# Patient Record
Sex: FEMALE | Race: OTHER | HISPANIC OR LATINO
[De-identification: names, ages, dates, MRNs, and addresses within clinical notes are randomized per-mention and may not be internally consistent; named-entity substitution may affect disease eponyms.]

---

## 2020-12-21 PROBLEM — Z00.00 ENCOUNTER FOR PREVENTIVE HEALTH EXAMINATION: Status: ACTIVE | Noted: 2020-12-21

## 2021-01-14 ENCOUNTER — APPOINTMENT (OUTPATIENT)
Dept: OBGYN | Facility: CLINIC | Age: 36
End: 2021-01-14
Payer: COMMERCIAL

## 2021-01-14 VITALS
HEIGHT: 64.5 IN | WEIGHT: 156 LBS | DIASTOLIC BLOOD PRESSURE: 67 MMHG | BODY MASS INDEX: 26.31 KG/M2 | TEMPERATURE: 98.6 F | HEART RATE: 78 BPM | SYSTOLIC BLOOD PRESSURE: 109 MMHG

## 2021-01-14 DIAGNOSIS — Z82.49 FAMILY HISTORY OF ISCHEMIC HEART DISEASE AND OTHER DISEASES OF THE CIRCULATORY SYSTEM: ICD-10-CM

## 2021-01-14 DIAGNOSIS — I82.409 ACUTE EMBOLISM AND THROMBOSIS OF UNSPECIFIED DEEP VEINS OF UNSPECIFIED LOWER EXTREMITY: ICD-10-CM

## 2021-01-14 DIAGNOSIS — Z83.3 FAMILY HISTORY OF DIABETES MELLITUS: ICD-10-CM

## 2021-01-14 DIAGNOSIS — Z78.9 OTHER SPECIFIED HEALTH STATUS: ICD-10-CM

## 2021-01-14 PROCEDURE — 99072 ADDL SUPL MATRL&STAF TM PHE: CPT

## 2021-01-14 PROCEDURE — 99205 OFFICE O/P NEW HI 60 MIN: CPT

## 2021-01-16 NOTE — DISCUSSION/SUMMARY
[FreeTextEntry1] : 34 yo G0 here for eval and management of lt hydrosalpinx prior to 3rd attempt at IVF.\par Pt w/past hx of Hirschsprung's dz --> significant adhesive disease as noted during rt salpingectomy 2015 (lsc --> xlap)\par Pt interested in lt salpingectomy to increase success rate of next embryo transfer.\par \par H/o DVT during IVF\par \par Plan\par USG\par Refer to colorectal surg for consultation for likely combined procedure.\par \par Consider lsc salpingectomy - consider vaginal approach for entry.\par \par Letter to Dr. Sheng Guadalupe\par Letter to Dr. Boni Kelsey\par \par \par

## 2021-01-16 NOTE — PHYSICAL EXAM
[Labia Majora] : normal [Labia Minora] : normal [Normal] : normal [Appropriately responsive] : appropriately responsive [FreeTextEntry7] : Multiple abd incisions.  [FreeTextEntry4] : Post CDS, free and mobile.

## 2021-01-16 NOTE — HISTORY OF PRESENT ILLNESS
[FreeTextEntry1] : 34 yo G0 here for eval.\par Pt diagnosed w/rt hydrosalpinx --> lsc --> xlap rt salpingectomy ()\par During that procedure spill from left on chromopertubation but unable to access left tube for salpingectomy.\par HSG now shows now spill from left.\par Pt s/p 2 failed embryo transfers thought to be secondary to hydrosalpinges.\par Pt s/p attempted hsc 2019 to obstruct tubal ostia via ablation.\par Followup HSG showed continued patency of lt tube.\par \par Pt w/h/o Hirschsprung dz as  - h/o multiple bowels surgeries. (confirmed by my conversation w/Dr. Boni Kelsey, her Peds surgeon.)\par \par \par Referred by Dr. Sheng Guadalupe.\par \par

## 2021-01-26 ENCOUNTER — APPOINTMENT (OUTPATIENT)
Dept: COLORECTAL SURGERY | Facility: CLINIC | Age: 36
End: 2021-01-26
Payer: COMMERCIAL

## 2021-01-26 VITALS
DIASTOLIC BLOOD PRESSURE: 71 MMHG | BODY MASS INDEX: 25.49 KG/M2 | WEIGHT: 153 LBS | SYSTOLIC BLOOD PRESSURE: 111 MMHG | OXYGEN SATURATION: 99 % | RESPIRATION RATE: 14 BRPM | HEIGHT: 65 IN | HEART RATE: 67 BPM | TEMPERATURE: 98.6 F

## 2021-01-26 PROCEDURE — 99204 OFFICE O/P NEW MOD 45 MIN: CPT

## 2021-01-26 PROCEDURE — 99072 ADDL SUPL MATRL&STAF TM PHE: CPT

## 2021-01-26 NOTE — ASSESSMENT
[FreeTextEntry1] : History of Hirschsprung's disease with multiple pelvic surgeries\par -Patient is here to undergo left fallopian tube excision to increase her chances of fertilization. This procedure is not without risk. I discussed the possibility of bowel injury and even colostomy creation.\par -I recommended initial evaluation of the pelvis. We will obtain a CT of the abdomen and pelvis to evaluate colonic anatomy and pelvic structures\par -I recommended patient undergo flexible sigmoidoscopy to evaluate rectal stump and for possible fixed pelvis\par -I will discuss the case with Dr. Wu To further determine risk/benefit Of undergoing pelvic surgery in the setting of this patient's infertility\par -All questions were answered patient will schedule the CT scan and sigmoidoscopy at her convenience

## 2021-01-26 NOTE — HISTORY OF PRESENT ILLNESS
[FreeTextEntry1] : Patient is a 34 yo female, referred by Dr Goldy Wu for consultation.  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  She has been progressing well with 2 bowel movements per day and no bowel complaints. 5 years ago she was noted to have a right hydrosalpinx and was taken to the OR for laparoscopy converted to open salpingectomy. The left tube could not be evaluated at that time. Currently patient is undergoing in vitro fertilization and has failed her to initial attempts at implantation. Implantation is affected by hydrosalpinx and she is now noted to have dilation of the left tube. She presents today to discuss excision. She was initially evaluated at [facility] and was told that the procedure would be significantly technically difficult. She was then referred to Dr. Wu for evaluation.  He recommended colorectal surgery consultation. As stated above, she currently is asymptomatic from an GI standpoint. No history of colonoscopy\par \par \par \par

## 2021-01-26 NOTE — PHYSICAL EXAM
[Normal Breath Sounds] : Normal breath sounds [Normal Heart Sounds] : normal heart sounds [Normal Rate and Rhythm] : normal rate and rhythm [Alert] : alert [Oriented to Person] : oriented to person [Oriented to Place] : oriented to place [Oriented to Time] : oriented to time [Calm] : calm [de-identified] : soft, +BS [de-identified] : well nourished, well appearing [de-identified] : NC/AT [de-identified] : +ROM/RACHAEL [de-identified] : intact

## 2021-01-26 NOTE — CONSULT LETTER
[Dear  ___] : Dear  [unfilled], [Consult Letter:] : I had the pleasure of evaluating your patient, [unfilled]. [Please see my note below.] : Please see my note below. [Sincerely,] : Sincerely, [FreeTextEntry2] : Goldy Wu [FreeTextEntry3] : Price Evangelista MD FACS\par Chief Colon and Rectal Surgery\par [facility]

## 2021-02-06 ENCOUNTER — TRANSCRIPTION ENCOUNTER (OUTPATIENT)
Age: 36
End: 2021-02-06

## 2021-02-06 ENCOUNTER — OUTPATIENT (OUTPATIENT)
Dept: OUTPATIENT SERVICES | Facility: HOSPITAL | Age: 36
LOS: 1 days | End: 2021-02-06
Payer: COMMERCIAL

## 2021-02-06 ENCOUNTER — RESULT REVIEW (OUTPATIENT)
Age: 36
End: 2021-02-06

## 2021-02-06 ENCOUNTER — APPOINTMENT (OUTPATIENT)
Dept: CT IMAGING | Facility: CLINIC | Age: 36
End: 2021-02-06
Payer: COMMERCIAL

## 2021-02-06 DIAGNOSIS — Z00.8 ENCOUNTER FOR OTHER GENERAL EXAMINATION: ICD-10-CM

## 2021-02-06 PROCEDURE — 74177 CT ABD & PELVIS W/CONTRAST: CPT

## 2021-02-06 PROCEDURE — 74177 CT ABD & PELVIS W/CONTRAST: CPT | Mod: 26

## 2021-02-19 RX ORDER — NEOMYCIN SULFATE 500 MG/1
500 TABLET ORAL
Qty: 3 | Refills: 0 | Status: ACTIVE | COMMUNITY
Start: 2021-02-19 | End: 1900-01-01

## 2021-02-19 RX ORDER — METRONIDAZOLE 500 MG/1
500 TABLET ORAL
Qty: 3 | Refills: 0 | Status: ACTIVE | COMMUNITY
Start: 2021-02-19 | End: 1900-01-01

## 2021-03-02 ENCOUNTER — ASOB RESULT (OUTPATIENT)
Age: 36
End: 2021-03-02

## 2021-03-02 ENCOUNTER — APPOINTMENT (OUTPATIENT)
Dept: OBGYN | Facility: CLINIC | Age: 36
End: 2021-03-02
Payer: COMMERCIAL

## 2021-03-02 VITALS
WEIGHT: 160.25 LBS | HEIGHT: 65 IN | TEMPERATURE: 97.8 F | SYSTOLIC BLOOD PRESSURE: 113 MMHG | HEART RATE: 64 BPM | BODY MASS INDEX: 26.7 KG/M2 | DIASTOLIC BLOOD PRESSURE: 71 MMHG

## 2021-03-02 PROCEDURE — 99212 OFFICE O/P EST SF 10 MIN: CPT

## 2021-03-02 PROCEDURE — 99072 ADDL SUPL MATRL&STAF TM PHE: CPT

## 2021-03-02 NOTE — HISTORY OF PRESENT ILLNESS
[FreeTextEntry1] : 36 yo G0 here for f/u after USG and consultation w/Dr. Jean Carlos mark.\par Pt with known lt hydrosalpinx in setting of history of multiple abd/pelvic procedures related to Hirschsprung's dz.\par Goal of planned procedure is to perform lt salpingectomy to maximize chance of IVF\par \par Pt has flex sigmoid scheduled for this Thursday.\par

## 2021-03-02 NOTE — DISCUSSION/SUMMARY
[FreeTextEntry1] : 34 yo G0 w/lt hydrosalpinx and pelvic with suspected adhesive disease.\par Pt now s/p consultation w/Dr. Evangelista, s/p CT abd/pelv, s/p USG today.\par Spoke to pt again about risks of surgery including, inability to perform salpingectomy or even to disrupt the left tube.  Also spoke about possibility of XLAP, and damage to bowel and bladder.\par Pt's  present on phone for conversation.\par \par Plan\par Pt to keep appointment for surgery first week of April.\par Consider atypical entry location (including posterior CDS)

## 2021-03-04 ENCOUNTER — APPOINTMENT (OUTPATIENT)
Dept: COLORECTAL SURGERY | Facility: CLINIC | Age: 36
End: 2021-03-04
Payer: COMMERCIAL

## 2021-03-04 DIAGNOSIS — Z87.738 PERSONAL HISTORY OF OTHER SPECIFIED (CORRECTED) CONGENITAL MALFORMATIONS OF DIGESTIVE SYSTEM: ICD-10-CM

## 2021-03-04 PROCEDURE — 99072 ADDL SUPL MATRL&STAF TM PHE: CPT

## 2021-03-04 PROCEDURE — 99213 OFFICE O/P EST LOW 20 MIN: CPT | Mod: 25

## 2021-03-04 PROCEDURE — 45330 DIAGNOSTIC SIGMOIDOSCOPY: CPT

## 2021-03-04 NOTE — HISTORY OF PRESENT ILLNESS
[FreeTextEntry1] : 35-year-old female with pan colonic Hirschsprung's disease status post modified Duhamel procedure as an infant. Patient is currently undergoing in vitro fertilization with hydrosalpinx on the left side it is believed that this will decrease her chances for fertilization. Patient is currently without complaint normal bowel movements no blood per rectum no fevers or chills no nausea or vomiting no aggravating factors

## 2021-03-04 NOTE — ASSESSMENT
[FreeTextEntry1] : Hydrosalpinx with history of Hirschsprung's\par -After discussion with the patient's pediatric surgeon and gynecologist, we will proceed with exploratory laparotomy and lysis of adhesions. The goal of the procedure will be identification and isolation of left fallopian tube for excision\par -We once again discussed risks and benefits of the procedure\par -CT imaging was reviewed\par -Patient proceed with surgery as discussed

## 2021-03-25 ENCOUNTER — OUTPATIENT (OUTPATIENT)
Dept: OUTPATIENT SERVICES | Facility: HOSPITAL | Age: 36
LOS: 1 days | End: 2021-03-25

## 2021-03-25 VITALS
OXYGEN SATURATION: 99 % | RESPIRATION RATE: 16 BRPM | DIASTOLIC BLOOD PRESSURE: 66 MMHG | SYSTOLIC BLOOD PRESSURE: 117 MMHG | HEIGHT: 65 IN | HEART RATE: 52 BPM | WEIGHT: 158.07 LBS | TEMPERATURE: 98 F

## 2021-03-25 DIAGNOSIS — Q43.1 HIRSCHSPRUNG'S DISEASE: Chronic | ICD-10-CM

## 2021-03-25 DIAGNOSIS — N70.11 CHRONIC SALPINGITIS: ICD-10-CM

## 2021-03-25 DIAGNOSIS — Z90.79 ACQUIRED ABSENCE OF OTHER GENITAL ORGAN(S): Chronic | ICD-10-CM

## 2021-03-25 LAB
A1C WITH ESTIMATED AVERAGE GLUCOSE RESULT: 4.9 % — SIGNIFICANT CHANGE UP (ref 4–5.6)
ANION GAP SERPL CALC-SCNC: 14 MMOL/L — SIGNIFICANT CHANGE UP (ref 7–14)
BLD GP AB SCN SERPL QL: NEGATIVE — SIGNIFICANT CHANGE UP
BUN SERPL-MCNC: 14 MG/DL — SIGNIFICANT CHANGE UP (ref 7–23)
CALCIUM SERPL-MCNC: 9.2 MG/DL — SIGNIFICANT CHANGE UP (ref 8.4–10.5)
CHLORIDE SERPL-SCNC: 102 MMOL/L — SIGNIFICANT CHANGE UP (ref 98–107)
CO2 SERPL-SCNC: 21 MMOL/L — LOW (ref 22–31)
CREAT SERPL-MCNC: 0.65 MG/DL — SIGNIFICANT CHANGE UP (ref 0.5–1.3)
ESTIMATED AVERAGE GLUCOSE: 94 MG/DL — SIGNIFICANT CHANGE UP (ref 68–114)
GLUCOSE SERPL-MCNC: 70 MG/DL — SIGNIFICANT CHANGE UP (ref 70–99)
HCG UR QL: NEGATIVE — SIGNIFICANT CHANGE UP
HCT VFR BLD CALC: 35.1 % — SIGNIFICANT CHANGE UP (ref 34.5–45)
HGB BLD-MCNC: 11.2 G/DL — LOW (ref 11.5–15.5)
MCHC RBC-ENTMCNC: 27.7 PG — SIGNIFICANT CHANGE UP (ref 27–34)
MCHC RBC-ENTMCNC: 31.9 GM/DL — LOW (ref 32–36)
MCV RBC AUTO: 86.7 FL — SIGNIFICANT CHANGE UP (ref 80–100)
NRBC # BLD: 0 /100 WBCS — SIGNIFICANT CHANGE UP
NRBC # FLD: 0 K/UL — SIGNIFICANT CHANGE UP
PLATELET # BLD AUTO: 215 K/UL — SIGNIFICANT CHANGE UP (ref 150–400)
POTASSIUM SERPL-MCNC: 3.8 MMOL/L — SIGNIFICANT CHANGE UP (ref 3.5–5.3)
POTASSIUM SERPL-SCNC: 3.8 MMOL/L — SIGNIFICANT CHANGE UP (ref 3.5–5.3)
RBC # BLD: 4.05 M/UL — SIGNIFICANT CHANGE UP (ref 3.8–5.2)
RBC # FLD: 14.8 % — HIGH (ref 10.3–14.5)
RH IG SCN BLD-IMP: POSITIVE — SIGNIFICANT CHANGE UP
SODIUM SERPL-SCNC: 137 MMOL/L — SIGNIFICANT CHANGE UP (ref 135–145)
WBC # BLD: 5.98 K/UL — SIGNIFICANT CHANGE UP (ref 3.8–10.5)
WBC # FLD AUTO: 5.98 K/UL — SIGNIFICANT CHANGE UP (ref 3.8–10.5)

## 2021-03-25 RX ORDER — CHLORHEXIDINE GLUCONATE 213 G/1000ML
1 SOLUTION TOPICAL DAILY
Refills: 0 | Status: DISCONTINUED | OUTPATIENT
Start: 2021-04-09 | End: 2021-04-12

## 2021-03-25 NOTE — H&P PST ADULT - NSICDXPASTMEDICALHX_GEN_ALL_CORE_FT
PAST MEDICAL HISTORY:  Anemia iron infusions 2020    Chronic salpingitis     DVT, lower extremity left 2020 treated with eliquis    Hirschsprung's disease

## 2021-03-25 NOTE — H&P PST ADULT - ASSESSMENT
Problem: chronic salpingitis, Hirschsprung's disease   Assessment and Plan: Pt is tentatively scheduled for lysis of adhesions possible bowel resection laparoscopic salpingectomy for 4/9/21. Pre-op instructions provided. Pt given verbal and written instructions with teach back on chlorhexidine shampoo and pepcid. Pt has a scheduled preop COVID test. Urine cup provided for day of procedure pregnancy test. Pt verbalized understanding with return demonstration.

## 2021-03-25 NOTE — H&P PST ADULT - HISTORY OF PRESENT ILLNESS
35 year old female presents to presurgical testing with diagnosis of chronic salpingitis and Hirschsprung's disease scheduled for lysis of adhesions possible bowel resection laparoscopic salpingectomy. Pt states during IVF evaluation, hydrosalpinx was found, h/o multiple abdominal surgeries for Hirschsprung as a child. 35 year old female presents to presurgical testing with diagnosis of chronic salpingitis and Hirschsprung's disease scheduled for lysis of adhesions possible bowel resection laparoscopic salpingectomy. Pt states during IVF evaluation, hydrosalpinx was found, pt with h/o multiple abdominal surgeries for Hirschsprung as a child.

## 2021-03-25 NOTE — H&P PST ADULT - NSICDXPASTSURGICALHX_GEN_ALL_CORE_FT
PAST SURGICAL HISTORY:  Hirschsprung's disease multiple surgeries    History of salpingectomy Right

## 2021-03-26 RX ORDER — NEOMYCIN SULFATE 500 MG/1
500 TABLET ORAL
Qty: 3 | Refills: 0 | Status: ACTIVE | COMMUNITY
Start: 2021-03-26 | End: 1900-01-01

## 2021-03-26 RX ORDER — METRONIDAZOLE 500 MG/1
500 TABLET ORAL
Qty: 3 | Refills: 0 | Status: ACTIVE | COMMUNITY
Start: 2021-03-26 | End: 1900-01-01

## 2021-03-30 DIAGNOSIS — Z01.818 ENCOUNTER FOR OTHER PREPROCEDURAL EXAMINATION: ICD-10-CM

## 2021-04-06 ENCOUNTER — APPOINTMENT (OUTPATIENT)
Dept: OBGYN | Facility: CLINIC | Age: 36
End: 2021-04-06

## 2021-04-07 LAB — SARS-COV-2 N GENE NPH QL NAA+PROBE: NOT DETECTED

## 2021-04-08 ENCOUNTER — TRANSCRIPTION ENCOUNTER (OUTPATIENT)
Age: 36
End: 2021-04-08

## 2021-04-08 NOTE — ASU PATIENT PROFILE, ADULT - PMH
Anemia  iron infusions 2020  Chronic salpingitis    DVT, lower extremity  left 2020 treated with eliquis  Hirschsprung's disease

## 2021-04-09 ENCOUNTER — APPOINTMENT (OUTPATIENT)
Dept: OBGYN | Facility: HOSPITAL | Age: 36
End: 2021-04-09

## 2021-04-09 ENCOUNTER — RESULT REVIEW (OUTPATIENT)
Age: 36
End: 2021-04-09

## 2021-04-09 ENCOUNTER — INPATIENT (INPATIENT)
Facility: HOSPITAL | Age: 36
LOS: 3 days | Discharge: HOME | End: 2021-04-13
Attending: STUDENT IN AN ORGANIZED HEALTH CARE EDUCATION/TRAINING PROGRAM | Admitting: STUDENT IN AN ORGANIZED HEALTH CARE EDUCATION/TRAINING PROGRAM
Payer: COMMERCIAL

## 2021-04-09 ENCOUNTER — APPOINTMENT (OUTPATIENT)
Dept: COLORECTAL SURGERY | Facility: HOSPITAL | Age: 36
End: 2021-04-09
Payer: COMMERCIAL

## 2021-04-09 VITALS
DIASTOLIC BLOOD PRESSURE: 55 MMHG | OXYGEN SATURATION: 98 % | SYSTOLIC BLOOD PRESSURE: 112 MMHG | WEIGHT: 158.07 LBS | HEIGHT: 65 IN | HEART RATE: 83 BPM | TEMPERATURE: 98 F | RESPIRATION RATE: 18 BRPM

## 2021-04-09 DIAGNOSIS — Z90.79 ACQUIRED ABSENCE OF OTHER GENITAL ORGAN(S): Chronic | ICD-10-CM

## 2021-04-09 DIAGNOSIS — K21.9 GASTRO-ESOPHAGEAL REFLUX DISEASE W/OUT ESOPHAGITIS: ICD-10-CM

## 2021-04-09 DIAGNOSIS — N70.11 CHRONIC SALPINGITIS: ICD-10-CM

## 2021-04-09 DIAGNOSIS — Q43.1 HIRSCHSPRUNG'S DISEASE: Chronic | ICD-10-CM

## 2021-04-09 LAB
GLUCOSE BLDC GLUCOMTR-MCNC: 146 MG/DL — HIGH (ref 70–99)
HCG UR QL: NEGATIVE — SIGNIFICANT CHANGE UP
RH IG SCN BLD-IMP: POSITIVE — SIGNIFICANT CHANGE UP

## 2021-04-09 PROCEDURE — 49320 DIAG LAPARO SEPARATE PROC: CPT

## 2021-04-09 PROCEDURE — 88302 TISSUE EXAM BY PATHOLOGIST: CPT | Mod: 26

## 2021-04-09 PROCEDURE — 58700 REMOVAL OF FALLOPIAN TUBE: CPT

## 2021-04-09 PROCEDURE — 50715 RELEASE OF URETER: CPT | Mod: 59

## 2021-04-09 RX ORDER — ENOXAPARIN SODIUM 100 MG/ML
40 INJECTION SUBCUTANEOUS DAILY
Refills: 0 | Status: DISCONTINUED | OUTPATIENT
Start: 2021-04-10 | End: 2021-04-13

## 2021-04-09 RX ORDER — OXYCODONE HYDROCHLORIDE 5 MG/1
5 TABLET ORAL
Refills: 0 | Status: DISCONTINUED | OUTPATIENT
Start: 2021-04-09 | End: 2021-04-09

## 2021-04-09 RX ORDER — CELECOXIB 200 MG/1
400 CAPSULE ORAL ONCE
Refills: 0 | Status: COMPLETED | OUTPATIENT
Start: 2021-04-09 | End: 2021-04-09

## 2021-04-09 RX ORDER — ACETAMINOPHEN 500 MG
1000 TABLET ORAL EVERY 6 HOURS
Refills: 0 | Status: COMPLETED | OUTPATIENT
Start: 2021-04-09 | End: 2021-04-10

## 2021-04-09 RX ORDER — HYDROMORPHONE HYDROCHLORIDE 2 MG/ML
0.5 INJECTION INTRAMUSCULAR; INTRAVENOUS; SUBCUTANEOUS EVERY 4 HOURS
Refills: 0 | Status: DISCONTINUED | OUTPATIENT
Start: 2021-04-09 | End: 2021-04-12

## 2021-04-09 RX ORDER — HYDROMORPHONE HYDROCHLORIDE 2 MG/ML
0.5 INJECTION INTRAMUSCULAR; INTRAVENOUS; SUBCUTANEOUS
Refills: 0 | Status: DISCONTINUED | OUTPATIENT
Start: 2021-04-09 | End: 2021-04-09

## 2021-04-09 RX ORDER — SODIUM CHLORIDE 9 MG/ML
1000 INJECTION, SOLUTION INTRAVENOUS
Refills: 0 | Status: DISCONTINUED | OUTPATIENT
Start: 2021-04-09 | End: 2021-04-10

## 2021-04-09 RX ORDER — OXYCODONE HYDROCHLORIDE 5 MG/1
10 TABLET ORAL
Refills: 0 | Status: DISCONTINUED | OUTPATIENT
Start: 2021-04-09 | End: 2021-04-09

## 2021-04-09 RX ORDER — ONDANSETRON 8 MG/1
4 TABLET, FILM COATED ORAL ONCE
Refills: 0 | Status: DISCONTINUED | OUTPATIENT
Start: 2021-04-09 | End: 2021-04-09

## 2021-04-09 RX ORDER — SODIUM CHLORIDE 9 MG/ML
1000 INJECTION, SOLUTION INTRAVENOUS
Refills: 0 | Status: DISCONTINUED | OUTPATIENT
Start: 2021-04-09 | End: 2021-04-09

## 2021-04-09 RX ORDER — ONDANSETRON 8 MG/1
4 TABLET, FILM COATED ORAL EVERY 6 HOURS
Refills: 0 | Status: DISCONTINUED | OUTPATIENT
Start: 2021-04-09 | End: 2021-04-10

## 2021-04-09 RX ORDER — NALOXONE HYDROCHLORIDE 4 MG/.1ML
0.1 SPRAY NASAL
Refills: 0 | Status: DISCONTINUED | OUTPATIENT
Start: 2021-04-09 | End: 2021-04-13

## 2021-04-09 RX ORDER — DIPHENHYDRAMINE HCL 50 MG
25 CAPSULE ORAL ONCE
Refills: 0 | Status: DISCONTINUED | OUTPATIENT
Start: 2021-04-09 | End: 2021-04-09

## 2021-04-09 RX ORDER — GABAPENTIN 400 MG/1
600 CAPSULE ORAL ONCE
Refills: 0 | Status: COMPLETED | OUTPATIENT
Start: 2021-04-09 | End: 2021-04-09

## 2021-04-09 RX ORDER — DIPHENHYDRAMINE HCL 50 MG
25 CAPSULE ORAL ONCE
Refills: 0 | Status: DISCONTINUED | OUTPATIENT
Start: 2021-04-09 | End: 2021-04-10

## 2021-04-09 RX ORDER — BUTORPHANOL TARTRATE 2 MG/ML
0.25 INJECTION, SOLUTION INTRAMUSCULAR; INTRAVENOUS EVERY 6 HOURS
Refills: 0 | Status: DISCONTINUED | OUTPATIENT
Start: 2021-04-09 | End: 2021-04-09

## 2021-04-09 RX ADMIN — SODIUM CHLORIDE 30 MILLILITER(S): 9 INJECTION, SOLUTION INTRAVENOUS at 06:42

## 2021-04-09 RX ADMIN — CELECOXIB 400 MILLIGRAM(S): 200 CAPSULE ORAL at 07:19

## 2021-04-09 RX ADMIN — SODIUM CHLORIDE 110 MILLILITER(S): 9 INJECTION, SOLUTION INTRAVENOUS at 14:44

## 2021-04-09 RX ADMIN — Medication 1000 MILLIGRAM(S): at 18:21

## 2021-04-09 RX ADMIN — GABAPENTIN 600 MILLIGRAM(S): 400 CAPSULE ORAL at 07:19

## 2021-04-09 RX ADMIN — Medication 400 MILLIGRAM(S): at 17:51

## 2021-04-09 NOTE — BRIEF OPERATIVE NOTE - OPERATION/FINDINGS
Grossly normal vaginal canal and cervix. Extensive adhesions from bowel to anterior abdominal wall and sidewall. Unable to visualize uterus, but based on physical exam felt approximately 9cm. Unable to visualize right ovary. Left ovary normal. Left fallopian tube noted with hydrosalpinx, and significant adhesions to bowel that were taken down.
Exploratory laparotomy with extensive lysis of adhesions.  Small serosal tear x2 repaired with lembert vicryl sutures.  Left salpingectomy performed with GYN.

## 2021-04-09 NOTE — BRIEF OPERATIVE NOTE - NSICDXBRIEFPREOP_GEN_ALL_CORE_FT
PRE-OP DIAGNOSIS:  Female infertility 09-Apr-2021 13:20:08  Wilebrt Sullivan  
PRE-OP DIAGNOSIS:  Hydrosalpinx 09-Apr-2021 12:59:51  Devon Campos

## 2021-04-09 NOTE — BRIEF OPERATIVE NOTE - NSICDXBRIEFPOSTOP_GEN_ALL_CORE_FT
POST-OP DIAGNOSIS:  Hydrosalpinx 09-Apr-2021 13:00:37  Devon Campos  
POST-OP DIAGNOSIS:  Hydrosalpinx 09-Apr-2021 13:00:37  Devon Campos

## 2021-04-09 NOTE — PATIENT PROFILE ADULT - NSPROEXTENSIONSOFSELF_GEN_A_NUR
Recurrent migraines. Seen in office and urgent care . Discussed DASHA. Referral to neurology.   none

## 2021-04-09 NOTE — CHART NOTE - NSCHARTNOTEFT_GEN_A_CORE
Post Operative Note  Patient: RUTH CAMACHO 35y (1985) Female   MRN: [id number]  Location: [place] [id number] B  Visit: 04-09-21 Inpatient  Date: 04-09-21 @ 17:10    Procedure: S/P ex-lap, GENO, left salpingectomy with gyn    Subjective: Patient reports pain well controlled. Endorses itching all over. Denies redness, swelling, SOB, fevers, chills. Reports has felt itching like this before that resolves with benadryl. Heat packs are helping relieve the abdominal pain.      Objective:  Vitals: T(F): 97.3 (04-09-21 @ 14:44), Max: 98.2 (04-09-21 @ 12:50)  HR: 107 (04-09-21 @ 14:44)  BP: 109/64 (04-09-21 @ 14:44) (102/66 - 118/47)  RR: 14 (04-09-21 @ 14:44)  SpO2: 97% (04-09-21 @ 14:44)  Vent Settings:     In:   04-09-21 @ 07:01  -  04-09-21 @ 17:10  --------------------------------------------------------  IN: 0 mL      IV Fluids: lactated ringers. 1000 milliLiter(s) (110 mL/Hr) IV Continuous <Continuous>      Out:   04-09-21 @ 07:01  -  04-09-21 @ 17:10  --------------------------------------------------------  OUT: 100 mL      EBL:     Voided Urine:   04-09-21 @ 07:01  -  04-09-21 @ 17:10  --------------------------------------------------------  OUT: 100 mL      Mora Catheter: yes no         Physical Examination:  General: NAD, resting comfortably in bed  HEENT: Normocephalic atraumatic  Respiratory: Nonlabored respirations, normal CW expansion.  Cardio: S1S2, regular rate and rhythm.  Abdomen: softly distended, appropriately tender, surgical incisions are c/d/i.     Imaging:  No post-op imaging studies    Assessment:  35yFemale patient S/P ex-lap, GENO, left salpingectomy with gyn. Patient is still -/- for GI fxn. Recovering well on floor.    Plan:  -NGT stays until passes gas  -Mora until tomorrow  -No toradol until tomorrow  - Pain control PRN  - Diet: NPO  - Activity: OOBAT  - DVT ppx: Lovenox    A team surgery [id number]    Date/Time: 04-09-21 @ 17:10 Post Operative Note  Patient: RUTH CAMACHO 35y (1985) Female   MRN: [id number]  Location: [place] [id number] B  Visit: 04-09-21 Inpatient  Date: 04-09-21 @ 17:10    Procedure: S/P ex-lap, GENO, left salpingectomy with gyn    Subjective: Patient reports pain well controlled. Endorses itching all over. Denies redness, swelling, SOB, fevers, chills. Reports has felt itching like this before that resolves with benadryl. Heat packs are helping relieve the abdominal pain.      Objective:  Vitals: T(F): 97.3 (04-09-21 @ 14:44), Max: 98.2 (04-09-21 @ 12:50)  HR: 107 (04-09-21 @ 14:44)  BP: 109/64 (04-09-21 @ 14:44) (102/66 - 118/47)  RR: 14 (04-09-21 @ 14:44)  SpO2: 97% (04-09-21 @ 14:44)  Vent Settings:     In:   04-09-21 @ 07:01  -  04-09-21 @ 17:10  --------------------------------------------------------  IN: 0 mL      IV Fluids: lactated ringers. 1000 milliLiter(s) (110 mL/Hr) IV Continuous <Continuous>      Out:   04-09-21 @ 07:01  -  04-09-21 @ 17:10  --------------------------------------------------------  OUT: 100 mL      EBL:     Voided Urine:   04-09-21 @ 07:01  -  04-09-21 @ 17:10  --------------------------------------------------------  OUT: 100 mL      Mora Catheter: no         Physical Examination:  General: NAD, resting comfortably in bed  HEENT: Normocephalic atraumatic  Respiratory: Nonlabored respirations, normal CW expansion.  Cardio: S1S2, regular rate and rhythm.  Abdomen: softly distended, appropriately tender, surgical incisions are c/d/i.     Imaging:  No post-op imaging studies    Assessment:  35yFemale patient S/P ex-lap, GENO, left salpingectomy with gyn. Patient is still -/- for GI fxn. Recovering well on floor.    Plan:  -NGT stays until passes gas  -Mora until tomorrow  -No toradol until tomorrow  - Pain control PRN  - Diet: NPO  - Activity: OOBAT  - DVT ppx: Lovenox    A team surgery [id number]    Date/Time: 04-09-21 @ 17:10

## 2021-04-09 NOTE — BRIEF OPERATIVE NOTE - NSICDXBRIEFPROCEDURE_GEN_ALL_CORE_FT
PROCEDURES:  Exploratory laparotomy 09-Apr-2021 13:19:40  Wilbert Sullivan  
PROCEDURES:  Salpingectomy, left 09-Apr-2021 12:59:23  Devon Campos

## 2021-04-10 LAB
ANION GAP SERPL CALC-SCNC: 7 MMOL/L — SIGNIFICANT CHANGE UP (ref 7–14)
BUN SERPL-MCNC: 6 MG/DL — LOW (ref 7–23)
CALCIUM SERPL-MCNC: 8.2 MG/DL — LOW (ref 8.4–10.5)
CHLORIDE SERPL-SCNC: 106 MMOL/L — SIGNIFICANT CHANGE UP (ref 98–107)
CO2 SERPL-SCNC: 24 MMOL/L — SIGNIFICANT CHANGE UP (ref 22–31)
COVID-19 SPIKE DOMAIN AB INTERP: POSITIVE
COVID-19 SPIKE DOMAIN ANTIBODY RESULT: >250 U/ML — HIGH
CREAT SERPL-MCNC: 0.55 MG/DL — SIGNIFICANT CHANGE UP (ref 0.5–1.3)
GLUCOSE SERPL-MCNC: 91 MG/DL — SIGNIFICANT CHANGE UP (ref 70–99)
HCT VFR BLD CALC: 31.9 % — LOW (ref 34.5–45)
HGB BLD-MCNC: 10.2 G/DL — LOW (ref 11.5–15.5)
MAGNESIUM SERPL-MCNC: 1.9 MG/DL — SIGNIFICANT CHANGE UP (ref 1.6–2.6)
MCHC RBC-ENTMCNC: 27.8 PG — SIGNIFICANT CHANGE UP (ref 27–34)
MCHC RBC-ENTMCNC: 32 GM/DL — SIGNIFICANT CHANGE UP (ref 32–36)
MCV RBC AUTO: 86.9 FL — SIGNIFICANT CHANGE UP (ref 80–100)
NRBC # BLD: 0 /100 WBCS — SIGNIFICANT CHANGE UP
NRBC # FLD: 0 K/UL — SIGNIFICANT CHANGE UP
PHOSPHATE SERPL-MCNC: 2.5 MG/DL — SIGNIFICANT CHANGE UP (ref 2.5–4.5)
PLATELET # BLD AUTO: 180 K/UL — SIGNIFICANT CHANGE UP (ref 150–400)
POTASSIUM SERPL-MCNC: 3.6 MMOL/L — SIGNIFICANT CHANGE UP (ref 3.5–5.3)
POTASSIUM SERPL-SCNC: 3.6 MMOL/L — SIGNIFICANT CHANGE UP (ref 3.5–5.3)
RBC # BLD: 3.67 M/UL — LOW (ref 3.8–5.2)
RBC # FLD: 14.6 % — HIGH (ref 10.3–14.5)
SARS-COV-2 IGG+IGM SERPL QL IA: >250 U/ML — HIGH
SARS-COV-2 IGG+IGM SERPL QL IA: POSITIVE
SODIUM SERPL-SCNC: 137 MMOL/L — SIGNIFICANT CHANGE UP (ref 135–145)
WBC # BLD: 7.36 K/UL — SIGNIFICANT CHANGE UP (ref 3.8–10.5)
WBC # FLD AUTO: 7.36 K/UL — SIGNIFICANT CHANGE UP (ref 3.8–10.5)

## 2021-04-10 RX ORDER — ACETAMINOPHEN 500 MG
1000 TABLET ORAL EVERY 6 HOURS
Refills: 0 | Status: COMPLETED | OUTPATIENT
Start: 2021-04-10 | End: 2021-04-11

## 2021-04-10 RX ORDER — KETOROLAC TROMETHAMINE 30 MG/ML
15 SYRINGE (ML) INJECTION ONCE
Refills: 0 | Status: DISCONTINUED | OUTPATIENT
Start: 2021-04-10 | End: 2021-04-10

## 2021-04-10 RX ORDER — DEXTROSE MONOHYDRATE, SODIUM CHLORIDE, AND POTASSIUM CHLORIDE 50; .745; 4.5 G/1000ML; G/1000ML; G/1000ML
1000 INJECTION, SOLUTION INTRAVENOUS
Refills: 0 | Status: DISCONTINUED | OUTPATIENT
Start: 2021-04-10 | End: 2021-04-12

## 2021-04-10 RX ORDER — POTASSIUM PHOSPHATE, MONOBASIC POTASSIUM PHOSPHATE, DIBASIC 236; 224 MG/ML; MG/ML
15 INJECTION, SOLUTION INTRAVENOUS ONCE
Refills: 0 | Status: COMPLETED | OUTPATIENT
Start: 2021-04-10 | End: 2021-04-10

## 2021-04-10 RX ADMIN — SODIUM CHLORIDE 110 MILLILITER(S): 9 INJECTION, SOLUTION INTRAVENOUS at 12:54

## 2021-04-10 RX ADMIN — Medication 1000 MILLIGRAM(S): at 06:00

## 2021-04-10 RX ADMIN — ENOXAPARIN SODIUM 40 MILLIGRAM(S): 100 INJECTION SUBCUTANEOUS at 11:52

## 2021-04-10 RX ADMIN — SODIUM CHLORIDE 110 MILLILITER(S): 9 INJECTION, SOLUTION INTRAVENOUS at 18:05

## 2021-04-10 RX ADMIN — Medication 1000 MILLIGRAM(S): at 18:25

## 2021-04-10 RX ADMIN — Medication 400 MILLIGRAM(S): at 00:00

## 2021-04-10 RX ADMIN — Medication 15 MILLIGRAM(S): at 15:48

## 2021-04-10 RX ADMIN — Medication 400 MILLIGRAM(S): at 18:05

## 2021-04-10 RX ADMIN — Medication 1000 MILLIGRAM(S): at 12:45

## 2021-04-10 RX ADMIN — POTASSIUM PHOSPHATE, MONOBASIC POTASSIUM PHOSPHATE, DIBASIC 62.5 MILLIMOLE(S): 236; 224 INJECTION, SOLUTION INTRAVENOUS at 12:52

## 2021-04-10 RX ADMIN — DEXTROSE MONOHYDRATE, SODIUM CHLORIDE, AND POTASSIUM CHLORIDE 110 MILLILITER(S): 50; .745; 4.5 INJECTION, SOLUTION INTRAVENOUS at 19:27

## 2021-04-10 RX ADMIN — Medication 1000 MILLIGRAM(S): at 00:30

## 2021-04-10 RX ADMIN — Medication 400 MILLIGRAM(S): at 11:51

## 2021-04-10 RX ADMIN — Medication 400 MILLIGRAM(S): at 05:14

## 2021-04-10 RX ADMIN — HYDROMORPHONE HYDROCHLORIDE 0.5 MILLIGRAM(S): 2 INJECTION INTRAMUSCULAR; INTRAVENOUS; SUBCUTANEOUS at 19:22

## 2021-04-10 RX ADMIN — HYDROMORPHONE HYDROCHLORIDE 0.5 MILLIGRAM(S): 2 INJECTION INTRAMUSCULAR; INTRAVENOUS; SUBCUTANEOUS at 19:45

## 2021-04-10 RX ADMIN — Medication 15 MILLIGRAM(S): at 16:10

## 2021-04-10 NOTE — PROGRESS NOTE ADULT - ASSESSMENT
A/P: 35y POD#1 s/p diagnostic laparoscopy converted to ex-lap, left salpingectomy, extensive GENO, and repair of bowel serosa combined case with gen surg.  Patient is stable and doing well, but has not had return of bowel function yet.    -Appreciate excellent care as per general surgery team  -Agree with plan for NGT until return of bowel function  -Gyn will continue to follow    RFrankel PGY2

## 2021-04-10 NOTE — PROGRESS NOTE ADULT - SUBJECTIVE AND OBJECTIVE BOX
Anesthesia Pain Management Service    SUBJECTIVE: Patient s/p spinal morphine initially & now on surgical spinal fusion protocol with pain manageable and no problems.  Pain Scale Score:  Refer to charted pain scores    THERAPY:    s/p spinal PF morphine yesterday.      MEDICATIONS  (STANDING):  acetaminophen  IVPB .. 1000 milliGRAM(s) IV Intermittent every 6 hours  chlorhexidine 2% Cloths 1 Application(s) Topical daily  enoxaparin Injectable 40 milliGRAM(s) SubCutaneous daily  lactated ringers. 1000 milliLiter(s) (110 mL/Hr) IV Continuous <Continuous>  potassium phosphate IVPB 15 milliMole(s) IV Intermittent once    MEDICATIONS  (PRN):  HYDROmorphone  Injectable 0.5 milliGRAM(s) IV Push every 4 hours PRN Moderate Pain (4 - 6)  naloxone Injectable 0.1 milliGRAM(s) IV Push every 3 minutes PRN For ANY of the following changes in patient status:  A. RR LESS THAN 10 breaths per minute, B. Oxygen saturation LESS THAN 90%, C. Sedation score of 6      OBJECTIVE: laying in bed     Sedation Score:	[ x] Alert	[ ] Drowsy	[ ] Arousable	[ ] Asleep	[ ] Unresponsive    Side Effects:	[ x] None	[ ] Nausea	[ ] Vomiting	[ ] Pruritus  		  [ ] Weakness		[ ] Numbness	[ ] Other:    Vital Signs Last 24 Hrs  T(C): 36.5 (10 Apr 2021 09:34), Max: 36.9 (10 Apr 2021 02:17)  T(F): 97.7 (10 Apr 2021 09:34), Max: 98.4 (10 Apr 2021 02:17)  HR: 62 (10 Apr 2021 09:34) (62 - 107)  BP: 101/66 (10 Apr 2021 09:34) (100/60 - 118/47)  BP(mean): 65 (09 Apr 2021 14:00) (60 - 72)  RR: 17 (10 Apr 2021 09:34) (12 - 17)  SpO2: 97% (10 Apr 2021 09:34) (95% - 100%)    ASSESSMENT/ PLAN  [  ] Patient transitioned to prn analgesics  [ ] Pain management per primary service, pain service to sign off   [x]Documentation and Verification of current medications     Comments: Standing & PRN Oral/IV opioids and diazepam plus non-opioid Adjuvant analgesics as per surgical spinal fusion  protocol. May call if pain not adequately controlled.    Progress Note written now but Patient was seen earlier. Anesthesia Pain Management Service    SUBJECTIVE: Patient s/p spinal morphine initially & now on surgical spinal fusion protocol with pain manageable and no problems.  Pain Scale Score:  Refer to charted pain scores    THERAPY:    s/p spinal PF morphine yesterday.      MEDICATIONS  (STANDING):  acetaminophen  IVPB .. 1000 milliGRAM(s) IV Intermittent every 6 hours  chlorhexidine 2% Cloths 1 Application(s) Topical daily  enoxaparin Injectable 40 milliGRAM(s) SubCutaneous daily  lactated ringers. 1000 milliLiter(s) (110 mL/Hr) IV Continuous <Continuous>  potassium phosphate IVPB 15 milliMole(s) IV Intermittent once    MEDICATIONS  (PRN):  HYDROmorphone  Injectable 0.5 milliGRAM(s) IV Push every 4 hours PRN Moderate Pain (4 - 6)  naloxone Injectable 0.1 milliGRAM(s) IV Push every 3 minutes PRN For ANY of the following changes in patient status:  A. RR LESS THAN 10 breaths per minute, B. Oxygen saturation LESS THAN 90%, C. Sedation score of 6      OBJECTIVE: laying in bed     Sedation Score:	[ x] Alert	[ ] Drowsy	[ ] Arousable	[ ] Asleep	[ ] Unresponsive    Side Effects:	[ x] None	[ ] Nausea	[ ] Vomiting	[ ] Pruritus  		  [ ] Weakness		[ ] Numbness	[ ] Other:    Vital Signs Last 24 Hrs  T(C): 36.5 (10 Apr 2021 09:34), Max: 36.9 (10 Apr 2021 02:17)  T(F): 97.7 (10 Apr 2021 09:34), Max: 98.4 (10 Apr 2021 02:17)  HR: 62 (10 Apr 2021 09:34) (62 - 107)  BP: 101/66 (10 Apr 2021 09:34) (100/60 - 118/47)  BP(mean): 65 (09 Apr 2021 14:00) (60 - 72)  RR: 17 (10 Apr 2021 09:34) (12 - 17)  SpO2: 97% (10 Apr 2021 09:34) (95% - 100%)    ASSESSMENT/ PLAN  [X  ] Patient transitioned to prn analgesics  [X ] Pain management per primary service, pain service to sign off   [x]Documentation and Verification of current medications     Comments: Standing & PRN Oral/IV opioids and diazepam plus non-opioid Adjuvant analgesics as per surgical spinal fusion  protocol. May call if pain not adequately controlled.    Progress Note written now but Patient was seen earlier.

## 2021-04-10 NOTE — PROGRESS NOTE ADULT - SUBJECTIVE AND OBJECTIVE BOX
SURGERY PROGRESS NOTE    SUBJECTIVE / 24H EVENTS:  Patient seen and examined on morning rounds. No acute events overnight.      OBJECTIVE:  VITAL SIGNS:  T(C): 36.6 (04-09-21 @ 18:00), Max: 36.8 (04-09-21 @ 12:50)  HR: 93 (04-09-21 @ 18:00) (83 - 107)  BP: 108/67 (04-09-21 @ 18:00) (102/66 - 118/47)  RR: 16 (04-09-21 @ 18:00) (12 - 18)  SpO2: 98% (04-09-21 @ 18:00) (95% - 100%)  Daily Height in cm: 165.1 (09 Apr 2021 06:14)    Daily   POCT Blood Glucose.: 146 mg/dL (04-09-21 @ 06:19)      PHYSICAL EXAM:  Gen: NAD  LS: Respirations unlabored on RA  GI: softly distended, appropriately tender, surgical incisions are c/d/i.   Ext: Warm, well perfused      04-09-21 @ 07:01  -  04-10-21 @ 02:07  --------------------------------------------------------  IN:    IV PiggyBack: 100 mL    Lactated Ringers: 440 mL  Total IN: 540 mL    OUT:    Indwelling Catheter - Urethral (mL): 325 mL  Total OUT: 325 mL    Total NET: 215 mL          LAB VALUES:                             MICROBIOLOGY:      RADIOLOGY:        MEDICATIONS  (STANDING):  acetaminophen  IVPB .. 1000 milliGRAM(s) IV Intermittent every 6 hours  chlorhexidine 2% Cloths 1 Application(s) Topical daily  diphenhydrAMINE   Injectable 25 milliGRAM(s) IV Push once  enoxaparin Injectable 40 milliGRAM(s) SubCutaneous daily  lactated ringers. 1000 milliLiter(s) (110 mL/Hr) IV Continuous <Continuous>    MEDICATIONS  (PRN):  HYDROmorphone  Injectable 0.5 milliGRAM(s) IV Push every 4 hours PRN Moderate Pain (4 - 6)  naloxone Injectable 0.1 milliGRAM(s) IV Push every 3 minutes PRN For ANY of the following changes in patient status:  A. RR LESS THAN 10 breaths per minute, B. Oxygen saturation LESS THAN 90%, C. Sedation score of 6  ondansetron Injectable 4 milliGRAM(s) IV Push every 6 hours PRN Nausea        SURGERY PROGRESS NOTE    SUBJECTIVE / 24H EVENTS:  Patient seen and examined on morning rounds. No acute events overnight. Patient reports she does not normally notice passing gas.       OBJECTIVE:  VITAL SIGNS:  T(C): 36.6 (04-09-21 @ 18:00), Max: 36.8 (04-09-21 @ 12:50)  HR: 93 (04-09-21 @ 18:00) (83 - 107)  BP: 108/67 (04-09-21 @ 18:00) (102/66 - 118/47)  RR: 16 (04-09-21 @ 18:00) (12 - 18)  SpO2: 98% (04-09-21 @ 18:00) (95% - 100%)  Daily Height in cm: 165.1 (09 Apr 2021 06:14)    Daily   POCT Blood Glucose.: 146 mg/dL (04-09-21 @ 06:19)      PHYSICAL EXAM:  Gen: NAD  LS: Respirations unlabored on RA  GI: softly distended, appropriately tender, surgical incisions are c/d/i.   Ext: Warm, well perfused      04-09-21 @ 07:01  -  04-10-21 @ 02:07  --------------------------------------------------------  IN:    IV PiggyBack: 100 mL    Lactated Ringers: 440 mL  Total IN: 540 mL    OUT:    Indwelling Catheter - Urethral (mL): 325 mL  Total OUT: 325 mL    Total NET: 215 mL          LAB VALUES:                             MICROBIOLOGY:      RADIOLOGY:        MEDICATIONS  (STANDING):  acetaminophen  IVPB .. 1000 milliGRAM(s) IV Intermittent every 6 hours  chlorhexidine 2% Cloths 1 Application(s) Topical daily  diphenhydrAMINE   Injectable 25 milliGRAM(s) IV Push once  enoxaparin Injectable 40 milliGRAM(s) SubCutaneous daily  lactated ringers. 1000 milliLiter(s) (110 mL/Hr) IV Continuous <Continuous>    MEDICATIONS  (PRN):  HYDROmorphone  Injectable 0.5 milliGRAM(s) IV Push every 4 hours PRN Moderate Pain (4 - 6)  naloxone Injectable 0.1 milliGRAM(s) IV Push every 3 minutes PRN For ANY of the following changes in patient status:  A. RR LESS THAN 10 breaths per minute, B. Oxygen saturation LESS THAN 90%, C. Sedation score of 6  ondansetron Injectable 4 milliGRAM(s) IV Push every 6 hours PRN Nausea

## 2021-04-10 NOTE — PROGRESS NOTE ADULT - SUBJECTIVE AND OBJECTIVE BOX
Anesthesia Pain Management Service    SUBJECTIVE: Patient s/p spinal morphine initially. Pain continues to be manageable.  Pain Scale Score:  Refer to charted pain scores    THERAPY:    s/p spinal PF morphine yesterday.      MEDICATIONS  (STANDING):  acetaminophen  IVPB .. 1000 milliGRAM(s) IV Intermittent every 6 hours  chlorhexidine 2% Cloths 1 Application(s) Topical daily  enoxaparin Injectable 40 milliGRAM(s) SubCutaneous daily  lactated ringers. 1000 milliLiter(s) (110 mL/Hr) IV Continuous <Continuous>  potassium phosphate IVPB 15 milliMole(s) IV Intermittent once    MEDICATIONS  (PRN):  HYDROmorphone  Injectable 0.5 milliGRAM(s) IV Push every 4 hours PRN Moderate Pain (4 - 6)  naloxone Injectable 0.1 milliGRAM(s) IV Push every 3 minutes PRN For ANY of the following changes in patient status:  A. RR LESS THAN 10 breaths per minute, B. Oxygen saturation LESS THAN 90%, C. Sedation score of 6      OBJECTIVE: laying in bed     Sedation Score:	[ x] Alert	[ ] Drowsy	[ ] Arousable	[ ] Asleep	[ ] Unresponsive    Side Effects:	[ x] None	[ ] Nausea	[ ] Vomiting	[ ] Pruritus  		  [ ] Weakness		[ ] Numbness	[ ] Other:    Vital Signs Last 24 Hrs  T(C): 36.5 (10 Apr 2021 09:34), Max: 36.9 (10 Apr 2021 02:17)  T(F): 97.7 (10 Apr 2021 09:34), Max: 98.4 (10 Apr 2021 02:17)  HR: 62 (10 Apr 2021 09:34) (62 - 107)  BP: 101/66 (10 Apr 2021 09:34) (100/60 - 118/47)  BP(mean): 65 (09 Apr 2021 14:00) (60 - 72)  RR: 17 (10 Apr 2021 09:34) (12 - 17)  SpO2: 97% (10 Apr 2021 09:34) (95% - 100%)    ASSESSMENT/ PLAN  [X  ] Patient transitioned to prn analgesics  [X ] Pain management per primary service, pain service to sign off   [x]Documentation and Verification of current medications     Comments: Standing & PRN Oral/IV opioids. May call if pain not adequately controlled.    Progress Note written now but Patient was seen earlier.

## 2021-04-10 NOTE — PROGRESS NOTE ADULT - SUBJECTIVE AND OBJECTIVE BOX
R2  GYN  Progress Note    POD#1   HD#2    Patient seen and examined at bedside.  No acute events overnight. No acute complaints.  Pain well controlled. She is NPO with NGT in place.   Patient is ambulating.  Has not yet passed flatus vs. Patient is passing flatus.    Patient is voiding spontaneously.  Denies CP, SOB, N/V, fevers, and chills.    Vital Signs Last 24 Hours  T(C): 36.8 (04-10-21 @ 05:13), Max: 36.9 (04-10-21 @ 02:17)  HR: 70 (04-10-21 @ 05:13) (64 - 107)  BP: 103/64 (04-10-21 @ 05:13) (100/60 - 118/47)  RR: 16 (04-10-21 @ 05:13) (12 - 18)  SpO2: 97% (04-10-21 @ 05:13) (95% - 100%)    I&O's Summary    09 Apr 2021 07:01  -  10 Apr 2021 05:36  --------------------------------------------------------  IN: 1080 mL / OUT: 875 mL / NET: 205 mL        Physical Exam:  General: NAD  CV: RR, S1, S2  Lungs: CTA b/l, good air flow b/l   Abdomen: Soft, mildly-tender to palpation diffusely, softly distended, normoactive bowel sounds  Incision: lsc incision sites, CDI  : no bleeding on pad  Ext: warm and well perfused    Labs:      MEDICATIONS  (STANDING):  acetaminophen  IVPB .. 1000 milliGRAM(s) IV Intermittent every 6 hours  chlorhexidine 2% Cloths 1 Application(s) Topical daily  diphenhydrAMINE   Injectable 25 milliGRAM(s) IV Push once  enoxaparin Injectable 40 milliGRAM(s) SubCutaneous daily  lactated ringers. 1000 milliLiter(s) (110 mL/Hr) IV Continuous <Continuous>    MEDICATIONS  (PRN):  HYDROmorphone  Injectable 0.5 milliGRAM(s) IV Push every 4 hours PRN Moderate Pain (4 - 6)  naloxone Injectable 0.1 milliGRAM(s) IV Push every 3 minutes PRN For ANY of the following changes in patient status:  A. RR LESS THAN 10 breaths per minute, B. Oxygen saturation LESS THAN 90%, C. Sedation score of 6  ondansetron Injectable 4 milliGRAM(s) IV Push every 6 hours PRN Nausea       R2  GYN  Progress Note    POD#1   HD#2    Patient seen and examined at bedside.  No acute events overnight. No acute complaints.  Pain well controlled. She is NPO with NGT in place.   Patient is ambulating.  Has not yet passed flatus but states that she normally only passes flatus with bowel movements.   Mora catheter removed this morning, she is DTV.   Denies CP, SOB, N/V, fevers, and chills.    Vital Signs Last 24 Hours  T(C): 36.8 (04-10-21 @ 05:13), Max: 36.9 (04-10-21 @ 02:17)  HR: 70 (04-10-21 @ 05:13) (64 - 107)  BP: 103/64 (04-10-21 @ 05:13) (100/60 - 118/47)  RR: 16 (04-10-21 @ 05:13) (12 - 18)  SpO2: 97% (04-10-21 @ 05:13) (95% - 100%)    I&O's Summary    09 Apr 2021 07:01  -  10 Apr 2021 05:36  --------------------------------------------------------  IN: 1080 mL / OUT: 875 mL / NET: 205 mL        Physical Exam:  General: NAD, NGT in place  CV: RR, S1, S2  Lungs: CTA b/l, good air flow b/l   Abdomen: Soft, mildly-tender to palpation diffusely, softly distended, normoactive bowel sounds  Incision: 1 lsc incision site and midline vertical incision, CDI  : no bleeding on pad  Ext: warm and well perfused    Labs:      MEDICATIONS  (STANDING):  acetaminophen  IVPB .. 1000 milliGRAM(s) IV Intermittent every 6 hours  chlorhexidine 2% Cloths 1 Application(s) Topical daily  diphenhydrAMINE   Injectable 25 milliGRAM(s) IV Push once  enoxaparin Injectable 40 milliGRAM(s) SubCutaneous daily  lactated ringers. 1000 milliLiter(s) (110 mL/Hr) IV Continuous <Continuous>    MEDICATIONS  (PRN):  HYDROmorphone  Injectable 0.5 milliGRAM(s) IV Push every 4 hours PRN Moderate Pain (4 - 6)  naloxone Injectable 0.1 milliGRAM(s) IV Push every 3 minutes PRN For ANY of the following changes in patient status:  A. RR LESS THAN 10 breaths per minute, B. Oxygen saturation LESS THAN 90%, C. Sedation score of 6  ondansetron Injectable 4 milliGRAM(s) IV Push every 6 hours PRN Nausea

## 2021-04-10 NOTE — PROGRESS NOTE ADULT - ASSESSMENT
35y Female patient S/P ex-lap, GENO, left salpingectomy with gyn. Patient is still -/- for GI fxn. Recovering well on floor.    Plan:  - NGT stays until passes gas  - d/c Mora  - OK fo toradol now  - Benadryl PRN for itching  - Pain control PRN  - Diet: NPO  - Activity: OOBAT  - DVT ppx: Lovenox    A Team Surgery  [id number]   35y Female patient POD 1 S/P ex-lap, GENO, left salpingectomy with gyn. Patient is still -/- for GI fxn. Recovering well on floor.    Plan:  - NGT stays for weekend  - d/c KEITH Mora  - OK fo toradol now  - Benadryl PRN for itching  - Pain control PRN  - Diet: NPO  - Activity: OOBAT  - DVT ppx: Lovenox    A Team Surgery  [id number]

## 2021-04-11 LAB
ANION GAP SERPL CALC-SCNC: 11 MMOL/L — SIGNIFICANT CHANGE UP (ref 7–14)
BUN SERPL-MCNC: 6 MG/DL — LOW (ref 7–23)
CALCIUM SERPL-MCNC: 8.8 MG/DL — SIGNIFICANT CHANGE UP (ref 8.4–10.5)
CHLORIDE SERPL-SCNC: 108 MMOL/L — HIGH (ref 98–107)
CO2 SERPL-SCNC: 22 MMOL/L — SIGNIFICANT CHANGE UP (ref 22–31)
CREAT SERPL-MCNC: 0.48 MG/DL — LOW (ref 0.5–1.3)
GLUCOSE SERPL-MCNC: 115 MG/DL — HIGH (ref 70–99)
HCT VFR BLD CALC: 33.4 % — LOW (ref 34.5–45)
HGB BLD-MCNC: 10.7 G/DL — LOW (ref 11.5–15.5)
MAGNESIUM SERPL-MCNC: 2 MG/DL — SIGNIFICANT CHANGE UP (ref 1.6–2.6)
MCHC RBC-ENTMCNC: 28 PG — SIGNIFICANT CHANGE UP (ref 27–34)
MCHC RBC-ENTMCNC: 32 GM/DL — SIGNIFICANT CHANGE UP (ref 32–36)
MCV RBC AUTO: 87.4 FL — SIGNIFICANT CHANGE UP (ref 80–100)
NRBC # BLD: 0 /100 WBCS — SIGNIFICANT CHANGE UP
NRBC # FLD: 0 K/UL — SIGNIFICANT CHANGE UP
PHOSPHATE SERPL-MCNC: 1.7 MG/DL — LOW (ref 2.5–4.5)
PLATELET # BLD AUTO: 183 K/UL — SIGNIFICANT CHANGE UP (ref 150–400)
POTASSIUM SERPL-MCNC: 3.5 MMOL/L — SIGNIFICANT CHANGE UP (ref 3.5–5.3)
POTASSIUM SERPL-SCNC: 3.5 MMOL/L — SIGNIFICANT CHANGE UP (ref 3.5–5.3)
RBC # BLD: 3.82 M/UL — SIGNIFICANT CHANGE UP (ref 3.8–5.2)
RBC # FLD: 14.6 % — HIGH (ref 10.3–14.5)
SODIUM SERPL-SCNC: 141 MMOL/L — SIGNIFICANT CHANGE UP (ref 135–145)
WBC # BLD: 6.66 K/UL — SIGNIFICANT CHANGE UP (ref 3.8–10.5)
WBC # FLD AUTO: 6.66 K/UL — SIGNIFICANT CHANGE UP (ref 3.8–10.5)

## 2021-04-11 RX ORDER — POTASSIUM PHOSPHATE, MONOBASIC POTASSIUM PHOSPHATE, DIBASIC 236; 224 MG/ML; MG/ML
30 INJECTION, SOLUTION INTRAVENOUS ONCE
Refills: 0 | Status: COMPLETED | OUTPATIENT
Start: 2021-04-11 | End: 2021-04-11

## 2021-04-11 RX ORDER — KETOROLAC TROMETHAMINE 30 MG/ML
15 SYRINGE (ML) INJECTION ONCE
Refills: 0 | Status: DISCONTINUED | OUTPATIENT
Start: 2021-04-11 | End: 2021-04-11

## 2021-04-11 RX ADMIN — POTASSIUM PHOSPHATE, MONOBASIC POTASSIUM PHOSPHATE, DIBASIC 83.33 MILLIMOLE(S): 236; 224 INJECTION, SOLUTION INTRAVENOUS at 12:25

## 2021-04-11 RX ADMIN — Medication 1000 MILLIGRAM(S): at 12:24

## 2021-04-11 RX ADMIN — Medication 15 MILLIGRAM(S): at 20:06

## 2021-04-11 RX ADMIN — ENOXAPARIN SODIUM 40 MILLIGRAM(S): 100 INJECTION SUBCUTANEOUS at 11:16

## 2021-04-11 RX ADMIN — Medication 400 MILLIGRAM(S): at 11:16

## 2021-04-11 RX ADMIN — Medication 1000 MILLIGRAM(S): at 00:34

## 2021-04-11 RX ADMIN — Medication 400 MILLIGRAM(S): at 00:04

## 2021-04-11 NOTE — PROGRESS NOTE ADULT - SUBJECTIVE AND OBJECTIVE BOX
A Team Surgery Daily Progress Note  =====================================================    SUBJECTIVE: Patient seen and examined at bedside on AM rounds. Patient reports that they're feeling well. Tolerating diet, denies nausea, vomiting. OOB/Ambulating as tolerated. Denies fever, chills.     24 HOUR EVENTS: Had a small BM yesterday    MEDICATIONS  (STANDING):  acetaminophen  IVPB .. 1000 milliGRAM(s) IV Intermittent every 6 hours  chlorhexidine 2% Cloths 1 Application(s) Topical daily  dextrose 5% + sodium chloride 0.45% with potassium chloride 20 mEq/L 1000 milliLiter(s) (110 mL/Hr) IV Continuous <Continuous>  enoxaparin Injectable 40 milliGRAM(s) SubCutaneous daily    MEDICATIONS  (PRN):  HYDROmorphone  Injectable 0.5 milliGRAM(s) IV Push every 4 hours PRN Moderate Pain (4 - 6)  naloxone Injectable 0.1 milliGRAM(s) IV Push every 3 minutes PRN For ANY of the following changes in patient status:  A. RR LESS THAN 10 breaths per minute, B. Oxygen saturation LESS THAN 90%, C. Sedation score of 6      OBJECTIVE:    Vital Signs Last 24 Hrs  T(C): 36.9 (10 Apr 2021 22:00), Max: 36.9 (10 Apr 2021 02:17)  T(F): 98.5 (10 Apr 2021 22:00), Max: 98.5 (10 Apr 2021 22:00)  HR: 75 (10 Apr 2021 22:00) (62 - 89)  BP: 119/72 (10 Apr 2021 22:00) (100/60 - 127/73)  BP(mean): --  RR: 17 (10 Apr 2021 22:00) (16 - 17)  SpO2: 99% (10 Apr 2021 22:00) (97% - 100%)    PHYSICAL EXAM:  Gen: NAD  LS: Respirations unlabored on RA, NGT  GI: softly distended, appropriately tender, surgical incisions are c/d/i.   Ext: Warm, well perfused        I&O's Detail    09 Apr 2021 07:01  -  10 Apr 2021 07:00  --------------------------------------------------------  IN:    IV PiggyBack: 200 mL    Lactated Ringers: 880 mL  Total IN: 1080 mL    OUT:    Indwelling Catheter - Urethral (mL): 775 mL    Nasogastric/Oral tube (mL): 100 mL  Total OUT: 875 mL    Total NET: 205 mL      10 Apr 2021 07:01  -  11 Apr 2021 02:03  --------------------------------------------------------  IN:    dextrose 5% + sodium chloride 0.45% w/ Additives: 550 mL    IV PiggyBack: 100 mL    Lactated Ringers: 880 mL  Total IN: 1530 mL    OUT:    Nasogastric/Oral tube (mL): 100 mL    Voided (mL): 950 mL  Total OUT: 1050 mL    Total NET: 480 mL          Daily     Daily     LABS:                        10.2   7.36  )-----------( 180      ( 10 Apr 2021 07:22 )             31.9     04-10    137  |  106  |  6<L>  ----------------------------<  91  3.6   |  24  |  0.55    Ca    8.2<L>      10 Apr 2021 07:29  Phos  2.5     04-10  Mg     1.9     04-10

## 2021-04-11 NOTE — PROGRESS NOTE ADULT - SUBJECTIVE AND OBJECTIVE BOX
R2  GYN  Progress Note    POD#2   HD#3    Patient seen and examined at bedside. No acute events overnight. No acute complaints.  Pain well controlled. Continues to be NPO with NGT in place.  Has not yet passed flatus but states she doesn't normally pass flatus except with bowel movements. Had small BM yesterday.  Patient is voiding spontaneously.   Denies CP, SOB, N/V, fevers, and chills.    Vital Signs Last 24 Hours  T(C): 36.8 (04-11-21 @ 02:09), Max: 36.9 (04-10-21 @ 22:00)  HR: 86 (04-11-21 @ 02:09) (62 - 89)  BP: 109/69 (04-11-21 @ 02:09) (101/66 - 127/73)  RR: 16 (04-11-21 @ 02:09) (16 - 17)  SpO2: 100% (04-11-21 @ 02:09) (97% - 100%)    I&O's Summary    09 Apr 2021 07:01  -  10 Apr 2021 07:00  --------------------------------------------------------  IN: 1080 mL / OUT: 875 mL / NET: 205 mL    10 Apr 2021 07:01  -  11 Apr 2021 05:43  --------------------------------------------------------  IN: 1530 mL / OUT: 1200 mL / NET: 330 mL      Physical Exam:  General: NAD, NGT in place  CV: RR, S1, S2  Lungs: CTA b/l, good air flow b/l   Abdomen: Soft, mildly-tender to palpation diffusely, softly distended, normoactive bowel sounds  Incision: 1 lsc incision site and midline vertical incision, CDI  : no bleeding on pad  Ext: warm and well perfused      Labs:                        10.2   7.36  )-----------( 180      ( 10 Apr 2021 07:22 )             31.9   baso x      eos x      imm gran x      lymph x      mono x      poly x          MEDICATIONS  (STANDING):  acetaminophen  IVPB .. 1000 milliGRAM(s) IV Intermittent every 6 hours  chlorhexidine 2% Cloths 1 Application(s) Topical daily  dextrose 5% + sodium chloride 0.45% with potassium chloride 20 mEq/L 1000 milliLiter(s) (110 mL/Hr) IV Continuous <Continuous>  enoxaparin Injectable 40 milliGRAM(s) SubCutaneous daily    MEDICATIONS  (PRN):  HYDROmorphone  Injectable 0.5 milliGRAM(s) IV Push every 4 hours PRN Moderate Pain (4 - 6)  naloxone Injectable 0.1 milliGRAM(s) IV Push every 3 minutes PRN For ANY of the following changes in patient status:  A. RR LESS THAN 10 breaths per minute, B. Oxygen saturation LESS THAN 90%, C. Sedation score of 6       R2  GYN  Progress Note    POD#2   HD#3    Patient seen and examined at bedside. Patient had one episode of nausea/emesis (mostly phlegm) associated with discomfort from NGT. Pain well controlled. Continues to be NPO with NGT in place.  Has not yet passed flatus but states she doesn't normally pass flatus except with bowel movements. Had small BM yesterday.  Patient is voiding spontaneously.   Denies CP, SOB, N/V, fevers, and chills.    Vital Signs Last 24 Hours  T(C): 36.8 (04-11-21 @ 02:09), Max: 36.9 (04-10-21 @ 22:00)  HR: 86 (04-11-21 @ 02:09) (62 - 89)  BP: 109/69 (04-11-21 @ 02:09) (101/66 - 127/73)  RR: 16 (04-11-21 @ 02:09) (16 - 17)  SpO2: 100% (04-11-21 @ 02:09) (97% - 100%)    I&O's Summary    09 Apr 2021 07:01  -  10 Apr 2021 07:00  --------------------------------------------------------  IN: 1080 mL / OUT: 875 mL / NET: 205 mL    10 Apr 2021 07:01  -  11 Apr 2021 05:43  --------------------------------------------------------  IN: 1530 mL / OUT: 1200 mL / NET: 330 mL      Physical Exam:  General: NAD, NGT in place  CV: RR, S1, S2  Lungs: CTA b/l, good air flow b/l   Abdomen: Soft, mildly-tender to palpation diffusely, softly distended, normoactive bowel sounds  Incision: 1 lsc incision site and midline vertical incision, CDI  : no bleeding on pad  Ext: warm and well perfused      Labs:                        10.2   7.36  )-----------( 180      ( 10 Apr 2021 07:22 )             31.9   baso x      eos x      imm gran x      lymph x      mono x      poly x          MEDICATIONS  (STANDING):  acetaminophen  IVPB .. 1000 milliGRAM(s) IV Intermittent every 6 hours  chlorhexidine 2% Cloths 1 Application(s) Topical daily  dextrose 5% + sodium chloride 0.45% with potassium chloride 20 mEq/L 1000 milliLiter(s) (110 mL/Hr) IV Continuous <Continuous>  enoxaparin Injectable 40 milliGRAM(s) SubCutaneous daily    MEDICATIONS  (PRN):  HYDROmorphone  Injectable 0.5 milliGRAM(s) IV Push every 4 hours PRN Moderate Pain (4 - 6)  naloxone Injectable 0.1 milliGRAM(s) IV Push every 3 minutes PRN For ANY of the following changes in patient status:  A. RR LESS THAN 10 breaths per minute, B. Oxygen saturation LESS THAN 90%, C. Sedation score of 6

## 2021-04-11 NOTE — PROGRESS NOTE ADULT - ASSESSMENT
35y Female patient POD 1 S/P ex-lap, GENO, left salpingectomy with gyn. Patient is -/+ for GI fxn. Recovering well on floor.    Plan:  - NGT stays for weekend  - passed TOV  - OK for toradol now  - Benadryl PRN for itching  - Pain control PRN  - Diet: NPO  - Activity: OOBAT  - DVT ppx: Lovenox    A Team Surgery  [id number] 35y Female patient POD 2 S/P ex-lap, GENO, left salpingectomy with gyn. Patient is -/+ for GI fxn. Recovering well on floor.    Plan:  - NGT stays for weekend  - passed TOV  - OK for toradol now  - Benadryl PRN for itching  - Pain control PRN  - Diet: NPO  - Activity: OOBAT  - DVT ppx: Lovenox    A Team Surgery  [id number] 35y Female patient POD 2 S/P ex-lap, GENO, left salpingectomy with gyn. Patient is -/+ for GI fxn. Recovering well on floor.    Plan:  - attending to assess if NGT still needed  - passed TOV  - OK for toradol now  - Benadryl PRN for itching  - Pain control PRN  - Diet: NPO  - Activity: OOBAT  - DVT ppx: Lovenox    A Team Surgery  [id number]

## 2021-04-11 NOTE — PROGRESS NOTE ADULT - ASSESSMENT
A/P: 35y POD#2 s/p diagnostic laparoscopy converted to ex-lap, left salpingectomy, extensive GENO, and repair of bowel serosa combined case with gen surg.  Patient is stable and doing well; patient had small BM yesterday, awaiting full return of bowel function.     -Appreciate excellent care as per general surgery team  -Agree with plan for NGT until complete return of bowel function; will follow output  -Gyn will continue to follow    RFrankel PGY2

## 2021-04-11 NOTE — PROGRESS NOTE ADULT - ATTENDING COMMENTS
Extensive GENO, history of prolonged ileus after similar surgery in the past. Had a small BM and gas yesterday and additional bowel function this afternoon. Complaining of discomfort w/ NG. Mora removed yesterday and voiding without issues.  NG output clear/gastric  abd soft, non-distended, appropriately tender to palpation  dressings taken down and incisions c/d/i w/ staples in place    - will d/c NG but continue NPO  - continue IVFs  - strict I&O's  - encourage ambulation    Damaris Reveles MD  Attending Physician
S/p extensive GENO yesterday. Denies any nausea or emesis at this time. No bowel function    NG tube in place  abd soft, non-distended, appropriately tender to palpation  dressings c/d/i  yates in place    - continue NPO, NG tube and IVFs  - d/c yates  - encourage ambulation    Damaris Reveles MD  Attending Physician

## 2021-04-12 DIAGNOSIS — N70.11 CHRONIC SALPINGITIS: ICD-10-CM

## 2021-04-12 LAB
ANION GAP SERPL CALC-SCNC: 8 MMOL/L — SIGNIFICANT CHANGE UP (ref 7–14)
BUN SERPL-MCNC: 4 MG/DL — LOW (ref 7–23)
CALCIUM SERPL-MCNC: 8.6 MG/DL — SIGNIFICANT CHANGE UP (ref 8.4–10.5)
CHLORIDE SERPL-SCNC: 108 MMOL/L — HIGH (ref 98–107)
CO2 SERPL-SCNC: 22 MMOL/L — SIGNIFICANT CHANGE UP (ref 22–31)
CREAT SERPL-MCNC: 0.48 MG/DL — LOW (ref 0.5–1.3)
GLUCOSE SERPL-MCNC: 112 MG/DL — HIGH (ref 70–99)
HCT VFR BLD CALC: 30.2 % — LOW (ref 34.5–45)
HGB BLD-MCNC: 9.9 G/DL — LOW (ref 11.5–15.5)
MAGNESIUM SERPL-MCNC: 1.8 MG/DL — SIGNIFICANT CHANGE UP (ref 1.6–2.6)
MCHC RBC-ENTMCNC: 28.1 PG — SIGNIFICANT CHANGE UP (ref 27–34)
MCHC RBC-ENTMCNC: 32.8 GM/DL — SIGNIFICANT CHANGE UP (ref 32–36)
MCV RBC AUTO: 85.8 FL — SIGNIFICANT CHANGE UP (ref 80–100)
NRBC # BLD: 0 /100 WBCS — SIGNIFICANT CHANGE UP
NRBC # FLD: 0 K/UL — SIGNIFICANT CHANGE UP
PHOSPHATE SERPL-MCNC: 2 MG/DL — LOW (ref 2.5–4.5)
PLATELET # BLD AUTO: 179 K/UL — SIGNIFICANT CHANGE UP (ref 150–400)
POTASSIUM SERPL-MCNC: 3.6 MMOL/L — SIGNIFICANT CHANGE UP (ref 3.5–5.3)
POTASSIUM SERPL-SCNC: 3.6 MMOL/L — SIGNIFICANT CHANGE UP (ref 3.5–5.3)
RBC # BLD: 3.52 M/UL — LOW (ref 3.8–5.2)
RBC # FLD: 14.6 % — HIGH (ref 10.3–14.5)
SODIUM SERPL-SCNC: 138 MMOL/L — SIGNIFICANT CHANGE UP (ref 135–145)
WBC # BLD: 6.33 K/UL — SIGNIFICANT CHANGE UP (ref 3.8–10.5)
WBC # FLD AUTO: 6.33 K/UL — SIGNIFICANT CHANGE UP (ref 3.8–10.5)

## 2021-04-12 RX ORDER — SODIUM,POTASSIUM PHOSPHATES 278-250MG
1 POWDER IN PACKET (EA) ORAL
Refills: 0 | Status: COMPLETED | OUTPATIENT
Start: 2021-04-12 | End: 2021-04-13

## 2021-04-12 RX ORDER — LANOLIN ALCOHOL/MO/W.PET/CERES
3 CREAM (GRAM) TOPICAL AT BEDTIME
Refills: 0 | Status: DISCONTINUED | OUTPATIENT
Start: 2021-04-12 | End: 2021-04-13

## 2021-04-12 RX ORDER — OXYCODONE HYDROCHLORIDE 5 MG/1
5 TABLET ORAL EVERY 4 HOURS
Refills: 0 | Status: DISCONTINUED | OUTPATIENT
Start: 2021-04-12 | End: 2021-04-13

## 2021-04-12 RX ORDER — MAGNESIUM SULFATE 500 MG/ML
2 VIAL (ML) INJECTION ONCE
Refills: 0 | Status: COMPLETED | OUTPATIENT
Start: 2021-04-12 | End: 2021-04-12

## 2021-04-12 RX ORDER — BENZOCAINE AND MENTHOL 5; 1 G/100ML; G/100ML
1 LIQUID ORAL
Refills: 0 | Status: DISCONTINUED | OUTPATIENT
Start: 2021-04-12 | End: 2021-04-13

## 2021-04-12 RX ORDER — ACETAMINOPHEN 500 MG
650 TABLET ORAL EVERY 6 HOURS
Refills: 0 | Status: DISCONTINUED | OUTPATIENT
Start: 2021-04-12 | End: 2021-04-12

## 2021-04-12 RX ORDER — ACETAMINOPHEN 500 MG
650 TABLET ORAL EVERY 6 HOURS
Refills: 0 | Status: DISCONTINUED | OUTPATIENT
Start: 2021-04-12 | End: 2021-04-13

## 2021-04-12 RX ORDER — POTASSIUM CHLORIDE 20 MEQ
40 PACKET (EA) ORAL ONCE
Refills: 0 | Status: COMPLETED | OUTPATIENT
Start: 2021-04-12 | End: 2021-04-12

## 2021-04-12 RX ADMIN — Medication 50 GRAM(S): at 09:55

## 2021-04-12 RX ADMIN — Medication 1 TABLET(S): at 11:51

## 2021-04-12 RX ADMIN — Medication 1 TABLET(S): at 19:17

## 2021-04-12 RX ADMIN — ENOXAPARIN SODIUM 40 MILLIGRAM(S): 100 INJECTION SUBCUTANEOUS at 11:50

## 2021-04-12 RX ADMIN — Medication 650 MILLIGRAM(S): at 19:45

## 2021-04-12 RX ADMIN — Medication 650 MILLIGRAM(S): at 12:20

## 2021-04-12 RX ADMIN — DEXTROSE MONOHYDRATE, SODIUM CHLORIDE, AND POTASSIUM CHLORIDE 110 MILLILITER(S): 50; .745; 4.5 INJECTION, SOLUTION INTRAVENOUS at 11:50

## 2021-04-12 RX ADMIN — DEXTROSE MONOHYDRATE, SODIUM CHLORIDE, AND POTASSIUM CHLORIDE 75 MILLILITER(S): 50; .745; 4.5 INJECTION, SOLUTION INTRAVENOUS at 16:09

## 2021-04-12 RX ADMIN — BENZOCAINE AND MENTHOL 1 LOZENGE: 5; 1 LIQUID ORAL at 12:00

## 2021-04-12 RX ADMIN — Medication 40 MILLIEQUIVALENT(S): at 09:56

## 2021-04-12 RX ADMIN — Medication 650 MILLIGRAM(S): at 19:17

## 2021-04-12 RX ADMIN — Medication 650 MILLIGRAM(S): at 11:50

## 2021-04-12 NOTE — PROGRESS NOTE ADULT - SUBJECTIVE AND OBJECTIVE BOX
R3 GYN Progress Note    POD#3   HD#4    Patient seen and examined at bedside.  No acute events overnight. She said she has been having small amounts of vaginal bleeding, but thinks it may be her period.  NGT removed overnight and patient's pain significantly improved. Her abdomen only hurts a a little when she sneezes.  Patient is ambulating and tolerating sips of water  Patient is passing flatus.    Patient is voiding spontaneously  Denies CP, SOB, N/V, fevers, and chills.    Vital Signs Last 24 Hours  T(C): 36.8 (04-12-21 @ 05:26), Max: 37.4 (04-11-21 @ 13:28)  HR: 78 (04-12-21 @ 05:26) (64 - 85)  BP: 108/52 (04-12-21 @ 05:26) (105/52 - 126/61)  RR: 18 (04-12-21 @ 05:26) (16 - 20)  SpO2: 98% (04-12-21 @ 05:26) (98% - 100%)    I&O's Summary    10 Apr 2021 07:01  -  11 Apr 2021 07:00  --------------------------------------------------------  IN: 1530 mL / OUT: 1400 mL / NET: 130 mL    11 Apr 2021 07:01  -  12 Apr 2021 06:47  --------------------------------------------------------  IN: 3164 mL / OUT: 1250 mL / NET: 1914 mL        Physical Exam:  General: NAD  CV: RR, S1, S2, no M/R/G  Lungs: CTA b/l, good air flow b/l   Abdomen: Soft, appropriately-tender, softly distended, tympanic, normoactive bowel sounds. No rebound/guarding  Incision: midline vertical abdominal incision CDI covered by gauze  : no bleeding on pad  Ext: No pain or swelling     Labs:                        10.7   6.66  )-----------( 183      ( 11 Apr 2021 07:51 )             33.4   baso x      eos x      imm gran x      lymph x      mono x      poly x                            10.2   7.36  )-----------( 180      ( 10 Apr 2021 07:22 )             31.9   baso x      eos x      imm gran x      lymph x      mono x      poly x          MEDICATIONS  (STANDING):  acetaminophen   Tablet .. 650 milliGRAM(s) Oral every 6 hours  dextrose 5% + sodium chloride 0.45% with potassium chloride 20 mEq/L 1000 milliLiter(s) (110 mL/Hr) IV Continuous <Continuous>  enoxaparin Injectable 40 milliGRAM(s) SubCutaneous daily  melatonin 3 milliGRAM(s) Oral at bedtime    MEDICATIONS  (PRN):  naloxone Injectable 0.1 milliGRAM(s) IV Push every 3 minutes PRN For ANY of the following changes in patient status:  A. RR LESS THAN 10 breaths per minute, B. Oxygen saturation LESS THAN 90%, C. Sedation score of 6  oxyCODONE    IR 5 milliGRAM(s) Oral every 4 hours PRN Severe Pain (7 - 10)

## 2021-04-12 NOTE — PROGRESS NOTE ADULT - ASSESSMENT
A/P: 35y POD#3 s/p diagnostic laparoscopy converted to ex-lap, left salpingectomy, extensive GENO, and repair of bowel serosa combined case with gen surg.  Patient is stable and doing well. NGT removed overnight with improvement of abdominal pain. She has been passing flatus and had a small bowel movement.

## 2021-04-12 NOTE — PROGRESS NOTE ADULT - SUBJECTIVE AND OBJECTIVE BOX
SURGERY PROGRESS NOTE    SUBJECTIVE / 24H EVENTS:  Patient seen and examined on morning rounds. NGT removed yesterday PM. GI function (++). Required toradol x 1 for crampy abdominal pain.       OBJECTIVE:  VITAL SIGNS:  T(C): 36.9 (04-11-21 @ 22:35), Max: 37.4 (04-11-21 @ 13:28)  HR: 66 (04-11-21 @ 22:35) (66 - 86)  BP: 105/52 (04-11-21 @ 22:35) (105/52 - 126/61)  RR: 17 (04-11-21 @ 17:37) (15 - 17)  SpO2: 100% (04-11-21 @ 22:35) (98% - 100%)  Daily     Daily       PHYSICAL EXAM:  Gen: NAD  LS: Respirations unlabored on RA  GI: softly distended, appropriately tender, surgical incisions are c/d/i.   Ext: Warm, well perfused      04-10-21 @ 07:01  -  04-11-21 @ 07:00  --------------------------------------------------------  IN:    dextrose 5% + sodium chloride 0.45% w/ Additives: 550 mL    IV PiggyBack: 100 mL    Lactated Ringers: 880 mL  Total IN: 1530 mL    OUT:    Nasogastric/Oral tube (mL): 100 mL    Voided (mL): 1300 mL  Total OUT: 1400 mL    Total NET: 130 mL      04-11-21 @ 07:01  -  04-12-21 @ 00:53  --------------------------------------------------------  IN:    dextrose 5% + sodium chloride 0.45% w/ Additives: 1768 mL    IV PiggyBack: 500 mL  Total IN: 2268 mL    OUT:    Oral Fluid: 0 mL    Voided (mL): 950 mL  Total OUT: 950 mL    Total NET: 1318 mL          LAB VALUES:  04-11    141  |  108<H>  |  6<L>  ----------------------------<  115<H>  3.5   |  22  |  0.48<L>    Ca    8.8      11 Apr 2021 07:51  Phos  1.7     04-11  Mg     2.0     04-11                                 10.7   6.66  )-----------( 183      ( 11 Apr 2021 07:51 )             33.4                   MICROBIOLOGY:      RADIOLOGY:        MEDICATIONS  (STANDING):  chlorhexidine 2% Cloths 1 Application(s) Topical daily  dextrose 5% + sodium chloride 0.45% with potassium chloride 20 mEq/L 1000 milliLiter(s) (110 mL/Hr) IV Continuous <Continuous>  enoxaparin Injectable 40 milliGRAM(s) SubCutaneous daily  melatonin 3 milliGRAM(s) Oral at bedtime    MEDICATIONS  (PRN):  HYDROmorphone  Injectable 0.5 milliGRAM(s) IV Push every 4 hours PRN Moderate Pain (4 - 6)  naloxone Injectable 0.1 milliGRAM(s) IV Push every 3 minutes PRN For ANY of the following changes in patient status:  A. RR LESS THAN 10 breaths per minute, B. Oxygen saturation LESS THAN 90%, C. Sedation score of 6        SURGERY PROGRESS NOTE    SUBJECTIVE / 24H EVENTS:  Patient seen and examined on morning rounds. NGT removed yesterday PM. GI function (++). Reports pain as controlled.      OBJECTIVE:  ICU Vital Signs Last 24 Hrs  T(C): 36.8 (12 Apr 2021 05:26), Max: 37.4 (11 Apr 2021 13:28)  T(F): 98.2 (12 Apr 2021 05:26), Max: 99.3 (11 Apr 2021 13:28)  HR: 78 (12 Apr 2021 05:26) (64 - 85)  BP: 108/52 (12 Apr 2021 05:26) (105/52 - 126/61)  RR: 18 (12 Apr 2021 05:26) (16 - 20)  SpO2: 98% (12 Apr 2021 05:26) (98% - 100%)      PHYSICAL EXAM:  Gen: NAD, resting comfortably.  LS: Respirations unlabored on RA, symmetric cw expansion  GI: softly distended, appropriately tender, surgical incisions are c/d/i.   Ext: Warm, well perfused, no edema.    I&O's Detail    11 Apr 2021 07:01  -  12 Apr 2021 07:00  --------------------------------------------------------  IN:    dextrose 5% + sodium chloride 0.45% w/ Additives: 2664 mL    IV PiggyBack: 500 mL  Total IN: 3164 mL    OUT:    Oral Fluid: 0 mL    Voided (mL): 1250 mL  Total OUT: 1250 mL    Total NET: 1914 mL        LAB VALUES:                         MEDICATIONS  (STANDING):  acetaminophen   Tablet .. 650 milliGRAM(s) Oral every 6 hours  dextrose 5% + sodium chloride 0.45% with potassium chloride 20 mEq/L 1000 milliLiter(s) (110 mL/Hr) IV Continuous <Continuous>  enoxaparin Injectable 40 milliGRAM(s) SubCutaneous daily  melatonin 3 milliGRAM(s) Oral at bedtime    MEDICATIONS  (PRN):  naloxone Injectable 0.1 milliGRAM(s) IV Push every 3 minutes PRN For ANY of the following changes in patient status:  A. RR LESS THAN 10 breaths per minute, B. Oxygen saturation LESS THAN 90%, C. Sedation score of 6  oxyCODONE    IR 5 milliGRAM(s) Oral every 4 hours PRN Severe Pain (7 - 10)

## 2021-04-12 NOTE — PROGRESS NOTE ADULT - PROBLEM SELECTOR PLAN 1
-Appreciate excellent care of gen surg team  -NGT removed ADAT per surgery  -Monitor vaginal bleeding  -GYN will continue to follow    Devon Campos PGY-3

## 2021-04-12 NOTE — PROGRESS NOTE ADULT - ASSESSMENT
35y Female patient POD 2 S/P ex-lap, GENO, left salpingectomy with gyn. Patient is -/+ for GI fxn. Recovering well on floor.    Plan:  - Advance to [facility]  - OK for toradol now  - Benadryl PRN for itching  - Pain control PRN  - Diet: NPO  - Activity: OOBAT  - DVT ppx: Lovenox    A Team Surgery  [id number] 35y Female patient POD 2 S/P ex-lap, GENO, left salpingectomy with gyn. Patient is -/+ for GI fxn. Recovering well on floor.    Plan:  - NPO/ IVF  - OK for toradol now  - Benadryl PRN for itching  - Pain control PRN  - Diet: NPO  - Activity: OOBAT  - DVT ppx: Lovenox    A Team Surgery  [id number] 35y Female patient POD 3 S/P ex-lap, GENO, left salpingectomy with gyn. Patient is -/+ for GI fxn. Recovering well on floor.    Plan:  - NPO/ IVF  - OK for toradol now  - Benadryl PRN for itching  - Pain control PRN  - Diet: NPO  - Activity: OOBAT  - DVT ppx: Lovenox    A Team Surgery  [id number]

## 2021-04-13 ENCOUNTER — TRANSCRIPTION ENCOUNTER (OUTPATIENT)
Age: 36
End: 2021-04-13

## 2021-04-13 VITALS
TEMPERATURE: 99 F | HEART RATE: 61 BPM | SYSTOLIC BLOOD PRESSURE: 109 MMHG | DIASTOLIC BLOOD PRESSURE: 59 MMHG | OXYGEN SATURATION: 100 % | RESPIRATION RATE: 18 BRPM

## 2021-04-13 LAB
ANION GAP SERPL CALC-SCNC: 7 MMOL/L — SIGNIFICANT CHANGE UP (ref 7–14)
BUN SERPL-MCNC: 2 MG/DL — LOW (ref 7–23)
CALCIUM SERPL-MCNC: 8.7 MG/DL — SIGNIFICANT CHANGE UP (ref 8.4–10.5)
CHLORIDE SERPL-SCNC: 109 MMOL/L — HIGH (ref 98–107)
CO2 SERPL-SCNC: 22 MMOL/L — SIGNIFICANT CHANGE UP (ref 22–31)
CREAT SERPL-MCNC: 0.54 MG/DL — SIGNIFICANT CHANGE UP (ref 0.5–1.3)
GLUCOSE SERPL-MCNC: 100 MG/DL — HIGH (ref 70–99)
HCT VFR BLD CALC: 30.5 % — LOW (ref 34.5–45)
HGB BLD-MCNC: 10 G/DL — LOW (ref 11.5–15.5)
MAGNESIUM SERPL-MCNC: 2 MG/DL — SIGNIFICANT CHANGE UP (ref 1.6–2.6)
MCHC RBC-ENTMCNC: 28.2 PG — SIGNIFICANT CHANGE UP (ref 27–34)
MCHC RBC-ENTMCNC: 32.8 GM/DL — SIGNIFICANT CHANGE UP (ref 32–36)
MCV RBC AUTO: 85.9 FL — SIGNIFICANT CHANGE UP (ref 80–100)
NRBC # BLD: 0 /100 WBCS — SIGNIFICANT CHANGE UP
NRBC # FLD: 0 K/UL — SIGNIFICANT CHANGE UP
PHOSPHATE SERPL-MCNC: 3.3 MG/DL — SIGNIFICANT CHANGE UP (ref 2.5–4.5)
PLATELET # BLD AUTO: 207 K/UL — SIGNIFICANT CHANGE UP (ref 150–400)
POTASSIUM SERPL-MCNC: 4.1 MMOL/L — SIGNIFICANT CHANGE UP (ref 3.5–5.3)
POTASSIUM SERPL-SCNC: 4.1 MMOL/L — SIGNIFICANT CHANGE UP (ref 3.5–5.3)
RBC # BLD: 3.55 M/UL — LOW (ref 3.8–5.2)
RBC # FLD: 14.5 % — SIGNIFICANT CHANGE UP (ref 10.3–14.5)
SODIUM SERPL-SCNC: 138 MMOL/L — SIGNIFICANT CHANGE UP (ref 135–145)
WBC # BLD: 6.09 K/UL — SIGNIFICANT CHANGE UP (ref 3.8–10.5)
WBC # FLD AUTO: 6.09 K/UL — SIGNIFICANT CHANGE UP (ref 3.8–10.5)

## 2021-04-13 RX ORDER — OXYCODONE HYDROCHLORIDE 5 MG/1
1 TABLET ORAL
Qty: 18 | Refills: 0
Start: 2021-04-13 | End: 2021-04-15

## 2021-04-13 RX ORDER — LANOLIN ALCOHOL/MO/W.PET/CERES
1 CREAM (GRAM) TOPICAL
Qty: 0 | Refills: 0 | DISCHARGE
Start: 2021-04-13

## 2021-04-13 RX ADMIN — ENOXAPARIN SODIUM 40 MILLIGRAM(S): 100 INJECTION SUBCUTANEOUS at 11:42

## 2021-04-13 RX ADMIN — Medication 1 TABLET(S): at 07:08

## 2021-04-13 RX ADMIN — BENZOCAINE AND MENTHOL 1 LOZENGE: 5; 1 LIQUID ORAL at 14:47

## 2021-04-13 RX ADMIN — Medication 650 MILLIGRAM(S): at 07:35

## 2021-04-13 RX ADMIN — Medication 650 MILLIGRAM(S): at 07:07

## 2021-04-13 NOTE — PROGRESS NOTE ADULT - ASSESSMENT
A/P: 35y POD#4 s/p diagnostic laparoscopy converted to ex-lap, left salpingectomy, extensive GENO, and repair of bowel serosa combined case with gen surg.  Patient is stable and doing well. s/p NGT (4/9-11). She continues to pass flatus and had multiple loose, watery bowel movements.

## 2021-04-13 NOTE — DISCHARGE NOTE NURSING/CASE MANAGEMENT/SOCIAL WORK - NSDCPNINST_GEN_ALL_CORE
Contact your provider for follow up appointment. Call your provider for signs of infection (fever, discharge from surgical incision site etc.), pain not relieved by prescribed pain meds. Take over the counter stool softeners to prevent constipation due to pain meds.

## 2021-04-13 NOTE — DISCHARGE NOTE PROVIDER - NSDCMRMEDTOKEN_GEN_ALL_CORE_FT
melatonin 3 mg oral tablet: 1 tab(s) orally once a day (at bedtime)  oxyCODONE 5 mg oral tablet: 1 tab(s) orally every 4 hours, As needed, Severe Pain (7 - 10) MDD:18mg  Prenatal Multivitamins: 1 tab(s) orally once a day last dose on 4/2/21

## 2021-04-13 NOTE — PROGRESS NOTE ADULT - PROBLEM SELECTOR PLAN 1
-Appreciate care per primary team  -s/p NGT 4/9-11, ADAT per surgery  -Monitor vaginal bleeding  -GYN will continue to follow    EDIE Ospina PGY4 -Appreciate care per primary team  -s/p NGT 4/9-11, ADAT per surgery  -Monitor vaginal bleeding  -Recommend abdominal binder for comfort as pt c/o pain when coughing  -GYN will continue to follow    EDIE Ospina PGY4

## 2021-04-13 NOTE — DISCHARGE NOTE PROVIDER - NSDCCPCAREPLAN_GEN_ALL_CORE_FT
PRINCIPAL DISCHARGE DIAGNOSIS  Diagnosis: S/P exploratory laparotomy  Assessment and Plan of Treatment: Please alternate taking tylenol and motrin for pain control. A prescription will be sent to your pharmacy for oxycodone. Please only take if you need for breakthrough pain.   You can shower normally and pat your incision dry after.  You can return to normal activity and tolerate your regular diet.  If you have intense or increasing abdominal pain, fever>101F, foul smelling discharge from you incision, or any concerns and questions, please call Dr. Evangelista's office. IF needed, you will be directed to the ED.   Please follow up with Dr. Evangelista's office in 7-10 days. If you have stitches/sutures that need to come out, they will be taken out in the office.

## 2021-04-13 NOTE — PROGRESS NOTE ADULT - ASSESSMENT
35y Female patient POD 3 S/P ex-lap, GENO, left salpingectomy with gyn. Patient is -/+ for GI fxn. Recovering well on floor.    Plan:  - Advance to regular diet/IVL  - OK for toradol now  - Benadryl PRN for itching  - Pain control PRN  - Activity: OOBAT  - DVT ppx: Lovenox    A Team Surgery  [id number] 35y Female patient POD4 S/P ex-lap, GENO, left salpingectomy with gyn. Patient is -/+ for GI fxn. Recovering well on floor.    Plan:  - Advance to regular diet/IVL  - OK for toradol now  - Benadryl PRN for itching  - Pain control PRN  - Activity: OOBAT  - DVT ppx: Lovenox    A Team Surgery  [id number] 35y Female patient POD4 S/P ex-lap, GENO, left salpingectomy with gyn. Patient is +/+ for GI fxn. Recovering well on floor.    Plan:  - Advance to regular diet/IVL  - OK for toradol now  - Benadryl PRN for itching  - Pain control PRN  - Activity: OOBAT  - DVT ppx: Lovenox    A Team Surgery  [id number]

## 2021-04-13 NOTE — PROGRESS NOTE ADULT - SUBJECTIVE AND OBJECTIVE BOX
SURGERY PROGRESS NOTE    SUBJECTIVE / 24H EVENTS:  Patient seen and examined on morning rounds. No acute events overnight. Tolerated clear liquid diet yesterday, GI fxn still (++)      OBJECTIVE:  VITAL SIGNS:  T(C): 36.8 (04-12-21 @ 22:20), Max: 37.2 (04-12-21 @ 14:00)  HR: 65 (04-12-21 @ 22:20) (64 - 78)  BP: 109/69 (04-12-21 @ 22:20) (100/55 - 120/61)  RR: 18 (04-12-21 @ 22:20) (18 - 20)  SpO2: 100% (04-12-21 @ 22:20) (98% - 100%)  Daily     Daily       PHYSICAL EXAM:  Gen: NAD, resting comfortably.  LS: Respirations unlabored on RA, symmetric cw expansion  GI: softly distended, appropriately tender, surgical incisions are c/d/i.   Ext: Warm, well perfused, no edema.      04-11-21 @ 07:01  -  04-12-21 @ 07:00  --------------------------------------------------------  IN:    dextrose 5% + sodium chloride 0.45% w/ Additives: 2664 mL    IV PiggyBack: 500 mL  Total IN: 3164 mL    OUT:    Oral Fluid: 0 mL    Voided (mL): 1250 mL  Total OUT: 1250 mL    Total NET: 1914 mL      04-12-21 @ 07:01  -  04-13-21 @ 00:48  --------------------------------------------------------  IN:    dextrose 5% + sodium chloride 0.45% w/ Additives: 880 mL    IV PiggyBack: 50 mL    Oral Fluid: 360 mL  Total IN: 1290 mL    OUT:  Total OUT: 0 mL    Total NET: 1290 mL          LAB VALUES:  04-12    138  |  108<H>  |  4<L>  ----------------------------<  112<H>  3.6   |  22  |  0.48<L>    Ca    8.6      12 Apr 2021 07:37  Phos  2.0     04-12  Mg     1.8     04-12                                 9.9    6.33  )-----------( 179      ( 12 Apr 2021 07:37 )             30.2                   MICROBIOLOGY:      RADIOLOGY:        MEDICATIONS  (STANDING):  acetaminophen   Tablet .. 650 milliGRAM(s) Oral every 6 hours  enoxaparin Injectable 40 milliGRAM(s) SubCutaneous daily  melatonin 3 milliGRAM(s) Oral at bedtime  potassium phosphate / sodium phosphate Tablet (K-PHOS No. 2) 1 Tablet(s) Oral four times a day with meals    MEDICATIONS  (PRN):  benzocaine 15 mG/menthol 3.6 mG (Sugar-Free) Lozenge 1 Lozenge Oral every 3 hours PRN Sore Throat  naloxone Injectable 0.1 milliGRAM(s) IV Push every 3 minutes PRN For ANY of the following changes in patient status:  A. RR LESS THAN 10 breaths per minute, B. Oxygen saturation LESS THAN 90%, C. Sedation score of 6  oxyCODONE    IR 5 milliGRAM(s) Oral every 4 hours PRN Severe Pain (7 - 10)        SURGERY PROGRESS NOTE    SUBJECTIVE / 24H EVENTS:  Patient seen and examined on morning rounds. No acute events overnight. Tolerated clear liquid diet yesterday, GI fxn still (++)      OBJECTIVE:  VITAL SIGNS:  ICU Vital Signs Last 24 Hrs  T(C): 36.5 (13 Apr 2021 07:06), Max: 37.2 (12 Apr 2021 14:00)  T(F): 97.7 (13 Apr 2021 07:06), Max: 98.9 (12 Apr 2021 14:00)  HR: 70 (13 Apr 2021 07:06) (65 - 87)  BP: 120/73 (13 Apr 2021 07:06) (100/55 - 128/77)  BP(mean): --  ABP: --  ABP(mean): --  RR: 17 (13 Apr 2021 07:06) (17 - 18)  SpO2: 98% (13 Apr 2021 07:06) (97% - 100%)      PHYSICAL EXAM:  Gen: NAD, resting comfortably.  LS: Respirations unlabored on RA, symmetric cw expansion  GI: softly distended, appropriately tender, surgical incisions are c/d/i.   Ext: Warm, well perfused, no edema.      04-11-21 @ 07:01  -  04-12-21 @ 07:00  --------------------------------------------------------  IN:    dextrose 5% + sodium chloride 0.45% w/ Additives: 2664 mL    IV PiggyBack: 500 mL  Total IN: 3164 mL    OUT:    Oral Fluid: 0 mL    Voided (mL): 1250 mL  Total OUT: 1250 mL    Total NET: 1914 mL      04-12-21 @ 07:01  -  04-13-21 @ 00:48  --------------------------------------------------------  IN:    dextrose 5% + sodium chloride 0.45% w/ Additives: 880 mL    IV PiggyBack: 50 mL    Oral Fluid: 360 mL  Total IN: 1290 mL    OUT:  Total OUT: 0 mL    Total NET: 1290 mL          LAB VALUES:              MEDICATIONS  (STANDING):  acetaminophen   Tablet .. 650 milliGRAM(s) Oral every 6 hours  enoxaparin Injectable 40 milliGRAM(s) SubCutaneous daily  melatonin 3 milliGRAM(s) Oral at bedtime  potassium phosphate / sodium phosphate Tablet (K-PHOS No. 2) 1 Tablet(s) Oral four times a day with meals    MEDICATIONS  (PRN):  benzocaine 15 mG/menthol 3.6 mG (Sugar-Free) Lozenge 1 Lozenge Oral every 3 hours PRN Sore Throat  naloxone Injectable 0.1 milliGRAM(s) IV Push every 3 minutes PRN For ANY of the following changes in patient status:  A. RR LESS THAN 10 breaths per minute, B. Oxygen saturation LESS THAN 90%, C. Sedation score of 6  oxyCODONE    IR 5 milliGRAM(s) Oral every 4 hours PRN Severe Pain (7 - 10)

## 2021-04-13 NOTE — DISCHARGE NOTE PROVIDER - HOSPITAL COURSE
35 year old female presents to presurgical testing with diagnosis of chronic salpingitis and Hirschsprung's disease scheduled for lysis of adhesions possible bowel resection laparoscopic salpingectomy. Pt states during IVF evaluation, hydrosalpinx was found, pt with h/o multiple abdominal surgeries for Hirschsprung as a child. Patient is now POD#4 s/p diagnostic laparoscopy converted to ex-lap, left salpingectomy, extensive GENO, and repair of bowel serosa combined case with gen surg.  Patient is stable and doing well. She is now s/p NGT (4/9-11). She continues to pass flatus, have multiple bowel movements, ambulate, and tolerate regular diet. On discharge patient will follow up with Dr. Evangelista outpatient in 1 week and have pain medication sent to her pharmacy.

## 2021-04-13 NOTE — PROGRESS NOTE ADULT - SUBJECTIVE AND OBJECTIVE BOX
R4 Gyn Progress Note POD#4  HD#5    Subjective:   Pt seen and examined at bedside. No events overnight. Pain moderately controlled but exacerbated when she coughs. Patient ambulating. Passing flatus and had multiple loose bowel movements. Tolerating clear diet. Pt denies fever, chills, chest pain, SOB, nausea, vomiting, lightheadedness, dizziness.      Objective:  T(F): 97.9 (04-13-21 @ 02:08), Max: 98.9 (04-12-21 @ 14:00)  HR: 87 (04-13-21 @ 02:08) (65 - 87)  BP: 128/77 (04-13-21 @ 02:08) (100/55 - 128/77)  RR: 18 (04-13-21 @ 02:08) (18 - 18)  SpO2: 97% (04-13-21 @ 02:08) (97% - 100%)  Wt(kg): --  I&O's Summary    12 Apr 2021 07:01  -  13 Apr 2021 07:00  --------------------------------------------------------  IN: 1440 mL / OUT: 0 mL / NET: 1440 mL      CAPILLARY BLOOD GLUCOSE          MEDICATIONS  (STANDING):  acetaminophen   Tablet .. 650 milliGRAM(s) Oral every 6 hours  enoxaparin Injectable 40 milliGRAM(s) SubCutaneous daily  melatonin 3 milliGRAM(s) Oral at bedtime  potassium phosphate / sodium phosphate Tablet (K-PHOS No. 2) 1 Tablet(s) Oral four times a day with meals    MEDICATIONS  (PRN):  benzocaine 15 mG/menthol 3.6 mG (Sugar-Free) Lozenge 1 Lozenge Oral every 3 hours PRN Sore Throat  naloxone Injectable 0.1 milliGRAM(s) IV Push every 3 minutes PRN For ANY of the following changes in patient status:  A. RR LESS THAN 10 breaths per minute, B. Oxygen saturation LESS THAN 90%, C. Sedation score of 6  oxyCODONE    IR 5 milliGRAM(s) Oral every 4 hours PRN Severe Pain (7 - 10)      Physical Exam:  Constitutional: NAD, A+O x3  CV: RR S1S2 no m/r/g  Lungs: CTA b/l, good air flow b/l  Abdomen: soft, softly-distended, appropriately-tender. no guarding, no rebound, normal bowel sounds  Incision: vertical midline incision closed with staples, c/d/i. Port site incisions c/d/i  Extremities: no lower extremity edema or calf tenderness bilaterally; venodynes in place    LABS:             9.9    6.33  )-----------( 179      ( 04-12 @ 07:37 )             30.2                10.7   6.66  )-----------( 183      ( 04-11 @ 07:51 )             33.4                10.2   7.36  )-----------( 180      ( 04-10 @ 07:22 )             31.9         04-12    138    |  108<H>  |  4<L>   ----------------------------<  112<H>  3.6     |  22     |  0.48<L>    Ca    8.6        12 Apr 2021 07:37  Phos  2.0       04-12  Mg     1.8       04-12

## 2021-04-13 NOTE — DISCHARGE NOTE NURSING/CASE MANAGEMENT/SOCIAL WORK - PATIENT PORTAL LINK FT
You can access the FollowMyHealth Patient Portal offered by [facility] by registering at the following website: [email or web address]. By joining [facility]’s FollowMyHealth portal, you will also be able to view your health information using other applications (apps) compatible with our system.

## 2021-04-13 NOTE — DISCHARGE NOTE PROVIDER - CARE PROVIDER_API CALL
Price Evangelista)  ColonRectal Surgery; Surgery  [facility], [address] [place] [address]  Phone: [phone]  Fax: [phone]  Follow Up Time: 1 week

## 2021-04-14 ENCOUNTER — NON-APPOINTMENT (OUTPATIENT)
Age: 36
End: 2021-04-14

## 2021-04-14 PROBLEM — N70.11 CHRONIC SALPINGITIS: Chronic | Status: ACTIVE | Noted: 2021-03-25

## 2021-04-14 PROBLEM — I82.409 ACUTE EMBOLISM AND THROMBOSIS OF UNSPECIFIED DEEP VEINS OF UNSPECIFIED LOWER EXTREMITY: Chronic | Status: ACTIVE | Noted: 2021-03-25

## 2021-04-14 PROBLEM — Q43.1 HIRSCHSPRUNG'S DISEASE: Chronic | Status: ACTIVE | Noted: 2021-03-25

## 2021-04-14 PROBLEM — D64.9 ANEMIA, UNSPECIFIED: Chronic | Status: ACTIVE | Noted: 2021-03-25

## 2021-04-15 ENCOUNTER — INPATIENT (INPATIENT)
Facility: HOSPITAL | Age: 36
LOS: 6 days | Discharge: HOME | End: 2021-04-22
Attending: OBSTETRICS & GYNECOLOGY | Admitting: OBSTETRICS & GYNECOLOGY
Payer: COMMERCIAL

## 2021-04-15 ENCOUNTER — NON-APPOINTMENT (OUTPATIENT)
Age: 36
End: 2021-04-15

## 2021-04-15 VITALS
OXYGEN SATURATION: 100 % | DIASTOLIC BLOOD PRESSURE: 71 MMHG | SYSTOLIC BLOOD PRESSURE: 107 MMHG | RESPIRATION RATE: 16 BRPM | HEART RATE: 60 BPM | HEIGHT: 65 IN | TEMPERATURE: 98 F

## 2021-04-15 DIAGNOSIS — Z90.79 ACQUIRED ABSENCE OF OTHER GENITAL ORGAN(S): Chronic | ICD-10-CM

## 2021-04-15 DIAGNOSIS — Q43.1 HIRSCHSPRUNG'S DISEASE: Chronic | ICD-10-CM

## 2021-04-15 RX ORDER — SODIUM CHLORIDE 9 MG/ML
1000 INJECTION INTRAMUSCULAR; INTRAVENOUS; SUBCUTANEOUS ONCE
Refills: 0 | Status: COMPLETED | OUTPATIENT
Start: 2021-04-15 | End: 2021-04-15

## 2021-04-15 RX ORDER — ONDANSETRON 8 MG/1
4 TABLET, FILM COATED ORAL ONCE
Refills: 0 | Status: COMPLETED | OUTPATIENT
Start: 2021-04-15 | End: 2021-04-15

## 2021-04-15 RX ADMIN — SODIUM CHLORIDE 1000 MILLILITER(S): 9 INJECTION INTRAMUSCULAR; INTRAVENOUS; SUBCUTANEOUS at 23:58

## 2021-04-15 RX ADMIN — ONDANSETRON 4 MILLIGRAM(S): 8 TABLET, FILM COATED ORAL at 23:58

## 2021-04-15 NOTE — ED ADULT NURSE NOTE - OBJECTIVE STATEMENT
alert oriented no distress no c/o CP dizziness or SOB  has been vomiting no abd pain or nausea unable to keep any liquid down  abd soft not tender  staple line clean no s/s of infection skin warm color good

## 2021-04-15 NOTE — ED PROVIDER NOTE - PROGRESS NOTE DETAILS
pt signed out to me this morning pending GYN and surg eval, seen by both and cleared by both for dc as does not appear to be obstructed on CT and tolerating PO. will dw patient re cdu stay for reassessment vs dc

## 2021-04-15 NOTE — ED PROVIDER NOTE - CLINICAL SUMMARY MEDICAL DECISION MAKING FREE TEXT BOX
35yF w/pmhx Hirschsprung, chronic salpingitis s/p left salpingectomy and adhesion lysis on 4/09 presenting with vomiting. Pt has hx of prior SBO. On exam pt is well appearing. Incision appears clean, no surrounding erythema or purulent drainage to suggest infection. Concern for SBO. Plan: cbc/cmp/vbg, CT abd/pelvis with oral contrast. 35yF w/pmhx Hirschsprung, chronic salpingitis s/p left salpingectomy and adhesion lysis on 4/09 presenting with vomiting. Pt has hx of prior SBO. On exam pt is well appearing, soft nontender abd, vitals within normal. Incision appears clean, no surrounding erythema or purulent drainage to suggest infection. Concern for SBO, less likely post op infection. Plan: cbc/cmp/vbg, CT abd/pelvis with oral contrast, antiemetics. Will continue to reassess

## 2021-04-15 NOTE — ED PROVIDER NOTE - PHYSICAL EXAMINATION
midline abdominal incision appears to be well healing, no surrounding erythema, no purulent drainage midline abdominal incision appears to be well healing, no surrounding erythema, no purulent drainage    Attending/Damaso: NAD, PERRL/EOMI, supple, RRR, CTAB, Abd-soft, ND, surgical site-+staples, C/D/I; no LE edema, +2 DP/PT; A&ox3, nonfocal

## 2021-04-15 NOTE — ED PROVIDER NOTE - OBJECTIVE STATEMENT
35yF w/pmhx Dinora 35yF w/pmhx Hirschsprung, chronic salpingitis s/p left salpingectomy and adhesion lysis on 4/09 presenting with vomiting. Pt states this afternoon she developed nausea with 3 episodes of vomiting, now unable to tolerate PO. She called her surgeons office and was instructed to come to the ED. Pt has a history of SBO. Pt reports abdominal discomfort since the surgery but denies worsening of pain. Associated pt reports she felt lightheaded. Pt passing flatus. Pt denies fever/chills, diarrhea, urinary complaints, cp, sob, weakness, headache or any other concerns. 35yF w/pmhx Hirschsprung, chronic salpingitis s/p left salpingectomy and adhesion lysis on 4/09 presenting with vomiting. Pt states this afternoon she developed nausea with 3 episodes of vomiting, now unable to tolerate PO. She called her surgeons office and was instructed to come to the ED. Pt has a history of SBO. Pt reports abdominal discomfort since the surgery but denies worsening of pain. Associated pt reports she felt lightheaded. Pt passing flatus. Pt denies fever/chills, diarrhea, urinary complaints, cp, sob, weakness, headache or any other concerns.    Attending/Damaso: 36 yo F as described above, s/p left [id number] and lysis of adhesions on 4/9 and now p/w nausea/vomiting since 5pm. Denies acute abd pain, fever/chills. +Flatus.

## 2021-04-15 NOTE — ED ADULT TRIAGE NOTE - CHIEF COMPLAINT QUOTE
Pt reporting to the ED for vomiting starting today, reports she can not tolerate po intake. Pt had salpingectomy on friday, surgeon Price arguelles advised pt to come to ED. Denies increased abdominal pain, surgical site redness/ drainage, fever.

## 2021-04-16 ENCOUNTER — TRANSCRIPTION ENCOUNTER (OUTPATIENT)
Age: 36
End: 2021-04-16

## 2021-04-16 DIAGNOSIS — K56.609 UNSPECIFIED INTESTINAL OBSTRUCTION, UNSPECIFIED AS TO PARTIAL VERSUS COMPLETE OBSTRUCTION: ICD-10-CM

## 2021-04-16 DIAGNOSIS — R11.2 NAUSEA WITH VOMITING, UNSPECIFIED: ICD-10-CM

## 2021-04-16 LAB
ALBUMIN SERPL ELPH-MCNC: 4 G/DL — SIGNIFICANT CHANGE UP (ref 3.3–5)
ALP SERPL-CCNC: 52 U/L — SIGNIFICANT CHANGE UP (ref 40–120)
ALT FLD-CCNC: 105 U/L — HIGH (ref 4–33)
ANION GAP SERPL CALC-SCNC: 14 MMOL/L — SIGNIFICANT CHANGE UP (ref 7–14)
ANION GAP SERPL CALC-SCNC: 15 MMOL/L — HIGH (ref 7–14)
APPEARANCE UR: CLEAR — SIGNIFICANT CHANGE UP
APTT BLD: 29.3 SEC — SIGNIFICANT CHANGE UP (ref 27–36.3)
APTT BLD: 29.7 SEC — SIGNIFICANT CHANGE UP (ref 27–36.3)
AST SERPL-CCNC: 93 U/L — HIGH (ref 4–32)
BASE EXCESS BLDV CALC-SCNC: -2.7 MMOL/L — SIGNIFICANT CHANGE UP (ref -3–2)
BASOPHILS # BLD AUTO: 0.02 K/UL — SIGNIFICANT CHANGE UP (ref 0–0.2)
BASOPHILS NFR BLD AUTO: 0.2 % — SIGNIFICANT CHANGE UP (ref 0–2)
BILIRUB SERPL-MCNC: 0.5 MG/DL — SIGNIFICANT CHANGE UP (ref 0.2–1.2)
BILIRUB UR-MCNC: NEGATIVE — SIGNIFICANT CHANGE UP
BLD GP AB SCN SERPL QL: NEGATIVE — SIGNIFICANT CHANGE UP
BLOOD GAS VENOUS - CREATININE: 0.6 MG/DL — SIGNIFICANT CHANGE UP (ref 0.5–1.3)
BLOOD GAS VENOUS COMPREHENSIVE RESULT: SIGNIFICANT CHANGE UP
BLOOD GAS VENOUS COMPREHENSIVE RESULT: SIGNIFICANT CHANGE UP
BUN SERPL-MCNC: 8 MG/DL — SIGNIFICANT CHANGE UP (ref 7–23)
BUN SERPL-MCNC: 8 MG/DL — SIGNIFICANT CHANGE UP (ref 7–23)
CALCIUM SERPL-MCNC: 9.1 MG/DL — SIGNIFICANT CHANGE UP (ref 8.4–10.5)
CALCIUM SERPL-MCNC: 9.2 MG/DL — SIGNIFICANT CHANGE UP (ref 8.4–10.5)
CHLORIDE BLDV-SCNC: 109 MMOL/L — HIGH (ref 96–108)
CHLORIDE SERPL-SCNC: 101 MMOL/L — SIGNIFICANT CHANGE UP (ref 98–107)
CHLORIDE SERPL-SCNC: 102 MMOL/L — SIGNIFICANT CHANGE UP (ref 98–107)
CO2 SERPL-SCNC: 17 MMOL/L — LOW (ref 22–31)
CO2 SERPL-SCNC: 20 MMOL/L — LOW (ref 22–31)
COLOR SPEC: YELLOW — SIGNIFICANT CHANGE UP
CREAT SERPL-MCNC: 0.48 MG/DL — LOW (ref 0.5–1.3)
CREAT SERPL-MCNC: 0.58 MG/DL — SIGNIFICANT CHANGE UP (ref 0.5–1.3)
DIFF PNL FLD: ABNORMAL
EOSINOPHIL # BLD AUTO: 0.02 K/UL — SIGNIFICANT CHANGE UP (ref 0–0.5)
EOSINOPHIL NFR BLD AUTO: 0.2 % — SIGNIFICANT CHANGE UP (ref 0–6)
EPI CELLS # UR: SIGNIFICANT CHANGE UP
GAS PNL BLDV: 139 MMOL/L — SIGNIFICANT CHANGE UP (ref 136–146)
GLUCOSE BLDV-MCNC: 120 MG/DL — HIGH (ref 70–99)
GLUCOSE SERPL-MCNC: 107 MG/DL — HIGH (ref 70–99)
GLUCOSE SERPL-MCNC: 120 MG/DL — HIGH (ref 70–99)
GLUCOSE UR QL: NEGATIVE — SIGNIFICANT CHANGE UP
HCO3 BLDV-SCNC: 21 MMOL/L — SIGNIFICANT CHANGE UP (ref 20–27)
HCT VFR BLD CALC: 34.2 % — LOW (ref 34.5–45)
HCT VFR BLD CALC: 37.6 % — SIGNIFICANT CHANGE UP (ref 34.5–45)
HCT VFR BLDA CALC: 36.2 % — SIGNIFICANT CHANGE UP (ref 34.5–46.5)
HGB BLD CALC-MCNC: 11.8 G/DL — SIGNIFICANT CHANGE UP (ref 11.5–15.5)
HGB BLD-MCNC: 11.1 G/DL — LOW (ref 11.5–15.5)
HGB BLD-MCNC: 12.2 G/DL — SIGNIFICANT CHANGE UP (ref 11.5–15.5)
IANC: 7.66 K/UL — SIGNIFICANT CHANGE UP (ref 1.5–8.5)
IMM GRANULOCYTES NFR BLD AUTO: 0.3 % — SIGNIFICANT CHANGE UP (ref 0–1.5)
INR BLD: 1.08 RATIO — SIGNIFICANT CHANGE UP (ref 0.88–1.16)
INR BLD: 1.12 RATIO — SIGNIFICANT CHANGE UP (ref 0.88–1.16)
KETONES UR-MCNC: ABNORMAL
LACTATE BLDV-MCNC: 1.7 MMOL/L — SIGNIFICANT CHANGE UP (ref 0.5–2)
LEUKOCYTE ESTERASE UR-ACNC: NEGATIVE — SIGNIFICANT CHANGE UP
LYMPHOCYTES # BLD AUTO: 0.77 K/UL — LOW (ref 1–3.3)
LYMPHOCYTES # BLD AUTO: 8.5 % — LOW (ref 13–44)
MCHC RBC-ENTMCNC: 27.8 PG — SIGNIFICANT CHANGE UP (ref 27–34)
MCHC RBC-ENTMCNC: 28.6 PG — SIGNIFICANT CHANGE UP (ref 27–34)
MCHC RBC-ENTMCNC: 32.4 GM/DL — SIGNIFICANT CHANGE UP (ref 32–36)
MCHC RBC-ENTMCNC: 32.5 GM/DL — SIGNIFICANT CHANGE UP (ref 32–36)
MCV RBC AUTO: 85.7 FL — SIGNIFICANT CHANGE UP (ref 80–100)
MCV RBC AUTO: 88.1 FL — SIGNIFICANT CHANGE UP (ref 80–100)
MONOCYTES # BLD AUTO: 0.51 K/UL — SIGNIFICANT CHANGE UP (ref 0–0.9)
MONOCYTES NFR BLD AUTO: 5.7 % — SIGNIFICANT CHANGE UP (ref 2–14)
NEUTROPHILS # BLD AUTO: 7.66 K/UL — HIGH (ref 1.8–7.4)
NEUTROPHILS NFR BLD AUTO: 85.1 % — HIGH (ref 43–77)
NITRITE UR-MCNC: NEGATIVE — SIGNIFICANT CHANGE UP
NRBC # BLD: 0 /100 WBCS — SIGNIFICANT CHANGE UP
NRBC # BLD: 0 /100 WBCS — SIGNIFICANT CHANGE UP
NRBC # FLD: 0 K/UL — SIGNIFICANT CHANGE UP
NRBC # FLD: 0 K/UL — SIGNIFICANT CHANGE UP
PCO2 BLDV: 41 MMHG — SIGNIFICANT CHANGE UP (ref 39–42)
PH BLDV: 7.35 — SIGNIFICANT CHANGE UP (ref 7.32–7.43)
PH UR: 6.5 — SIGNIFICANT CHANGE UP (ref 5–8)
PLATELET # BLD AUTO: 266 K/UL — SIGNIFICANT CHANGE UP (ref 150–400)
PLATELET # BLD AUTO: 295 K/UL — SIGNIFICANT CHANGE UP (ref 150–400)
PO2 BLDV: 36 MMHG — SIGNIFICANT CHANGE UP (ref 35–40)
POTASSIUM BLDV-SCNC: 3.4 MMOL/L — SIGNIFICANT CHANGE UP (ref 3.4–4.5)
POTASSIUM SERPL-MCNC: 3.6 MMOL/L — SIGNIFICANT CHANGE UP (ref 3.5–5.3)
POTASSIUM SERPL-MCNC: 5.8 MMOL/L — HIGH (ref 3.5–5.3)
POTASSIUM SERPL-SCNC: 3.6 MMOL/L — SIGNIFICANT CHANGE UP (ref 3.5–5.3)
POTASSIUM SERPL-SCNC: 5.8 MMOL/L — HIGH (ref 3.5–5.3)
PROT SERPL-MCNC: 7.8 G/DL — SIGNIFICANT CHANGE UP (ref 6–8.3)
PROT UR-MCNC: ABNORMAL
PROTHROM AB SERPL-ACNC: 12.3 SEC — SIGNIFICANT CHANGE UP (ref 10.6–13.6)
PROTHROM AB SERPL-ACNC: 12.8 SEC — SIGNIFICANT CHANGE UP (ref 10.6–13.6)
RBC # BLD: 3.99 M/UL — SIGNIFICANT CHANGE UP (ref 3.8–5.2)
RBC # BLD: 4.27 M/UL — SIGNIFICANT CHANGE UP (ref 3.8–5.2)
RBC # FLD: 14.6 % — HIGH (ref 10.3–14.5)
RBC # FLD: 15.3 % — HIGH (ref 10.3–14.5)
RBC CASTS # UR COMP ASSIST: SIGNIFICANT CHANGE UP /HPF (ref 0–4)
RH IG SCN BLD-IMP: POSITIVE — SIGNIFICANT CHANGE UP
SAO2 % BLDV: 58.3 % — LOW (ref 60–85)
SARS-COV-2 RNA SPEC QL NAA+PROBE: SIGNIFICANT CHANGE UP
SODIUM SERPL-SCNC: 133 MMOL/L — LOW (ref 135–145)
SODIUM SERPL-SCNC: 136 MMOL/L — SIGNIFICANT CHANGE UP (ref 135–145)
SP GR SPEC: >1.05 (ref 1.01–1.02)
UROBILINOGEN FLD QL: SIGNIFICANT CHANGE UP
WBC # BLD: 8.13 K/UL — SIGNIFICANT CHANGE UP (ref 3.8–10.5)
WBC # BLD: 9.01 K/UL — SIGNIFICANT CHANGE UP (ref 3.8–10.5)
WBC # FLD AUTO: 8.13 K/UL — SIGNIFICANT CHANGE UP (ref 3.8–10.5)
WBC # FLD AUTO: 9.01 K/UL — SIGNIFICANT CHANGE UP (ref 3.8–10.5)
WBC UR QL: SIGNIFICANT CHANGE UP /HPF (ref 0–5)

## 2021-04-16 PROCEDURE — 74177 CT ABD & PELVIS W/CONTRAST: CPT | Mod: 26

## 2021-04-16 RX ORDER — ONDANSETRON 8 MG/1
4 TABLET, FILM COATED ORAL EVERY 6 HOURS
Refills: 0 | Status: DISCONTINUED | OUTPATIENT
Start: 2021-04-16 | End: 2021-04-22

## 2021-04-16 RX ORDER — KETOROLAC TROMETHAMINE 30 MG/ML
15 SYRINGE (ML) INJECTION EVERY 6 HOURS
Refills: 0 | Status: DISCONTINUED | OUTPATIENT
Start: 2021-04-16 | End: 2021-04-19

## 2021-04-16 RX ORDER — HEPARIN SODIUM 5000 [USP'U]/ML
5000 INJECTION INTRAVENOUS; SUBCUTANEOUS EVERY 12 HOURS
Refills: 0 | Status: DISCONTINUED | OUTPATIENT
Start: 2021-04-16 | End: 2021-04-22

## 2021-04-16 RX ORDER — SODIUM CHLORIDE 9 MG/ML
1000 INJECTION INTRAMUSCULAR; INTRAVENOUS; SUBCUTANEOUS ONCE
Refills: 0 | Status: COMPLETED | OUTPATIENT
Start: 2021-04-16 | End: 2021-04-16

## 2021-04-16 RX ORDER — METOCLOPRAMIDE HCL 10 MG
10 TABLET ORAL EVERY 6 HOURS
Refills: 0 | Status: DISCONTINUED | OUTPATIENT
Start: 2021-04-16 | End: 2021-04-22

## 2021-04-16 RX ORDER — ONDANSETRON 8 MG/1
4 TABLET, FILM COATED ORAL ONCE
Refills: 0 | Status: COMPLETED | OUTPATIENT
Start: 2021-04-16 | End: 2021-04-16

## 2021-04-16 RX ORDER — SODIUM CHLORIDE 9 MG/ML
1000 INJECTION, SOLUTION INTRAVENOUS
Refills: 0 | Status: DISCONTINUED | OUTPATIENT
Start: 2021-04-16 | End: 2021-04-22

## 2021-04-16 RX ORDER — FAMOTIDINE 10 MG/ML
20 INJECTION INTRAVENOUS DAILY
Refills: 0 | Status: DISCONTINUED | OUTPATIENT
Start: 2021-04-16 | End: 2021-04-18

## 2021-04-16 RX ORDER — ACETAMINOPHEN 500 MG
975 TABLET ORAL EVERY 6 HOURS
Refills: 0 | Status: DISCONTINUED | OUTPATIENT
Start: 2021-04-16 | End: 2021-04-22

## 2021-04-16 RX ADMIN — ONDANSETRON 4 MILLIGRAM(S): 8 TABLET, FILM COATED ORAL at 14:06

## 2021-04-16 RX ADMIN — HEPARIN SODIUM 5000 UNIT(S): 5000 INJECTION INTRAVENOUS; SUBCUTANEOUS at 18:15

## 2021-04-16 RX ADMIN — ONDANSETRON 4 MILLIGRAM(S): 8 TABLET, FILM COATED ORAL at 22:24

## 2021-04-16 RX ADMIN — SODIUM CHLORIDE 1000 MILLILITER(S): 9 INJECTION INTRAMUSCULAR; INTRAVENOUS; SUBCUTANEOUS at 14:06

## 2021-04-16 RX ADMIN — SODIUM CHLORIDE 125 MILLILITER(S): 9 INJECTION, SOLUTION INTRAVENOUS at 22:24

## 2021-04-16 RX ADMIN — FAMOTIDINE 20 MILLIGRAM(S): 10 INJECTION INTRAVENOUS at 14:06

## 2021-04-16 NOTE — CONSULT NOTE ADULT - SUBJECTIVE AND OBJECTIVE BOX
RUTH CAMACHO  35y  Female [id number]    HPI: 36yo PMH Hirschsprung's Dx, chronic salpingitis, POD#7 s/p ex-lap, LS, extensive GENO, repair of bowel serosal defect w/ general surgery, presents with chief concern of nausea/vomiting x1 day. She notes that she vomited 3 times last night, emesis containing food particles, NBNB. Patient denies abdominal pain, fevers, chills, chest pain, or SOB. On presentation to the ED she was experiencing lower abdominal pain, however this has since resolved. In the ED, she received antiemetic and underwent PO challenge. At the bedside, patient states that she is no longer nauseous, and has not vomited since last night. She tolerated crackers and Gatorade. She is passing flatus and had a small BM while in the ED. She denies any constipation or diarrhea.     Name of GYN Physician: Dr. Wu    POB:     Pgyn: Denies fibroids, cysts, endometriosis, STI's, Abnormal pap smears     Home meds: [facility] Meds:   MEDICATIONS  (STANDING):    MEDICATIONS  (PRN):      Allergies    No Known Allergies    Intolerances        PAST MEDICAL & SURGICAL HISTORY:  Hirschsprung&#x27;s disease    Chronic salpingitis    DVT, lower extremity  left 2020 treated with eliquis    Anemia  iron infusions 2020    History of salpingectomy  Right    Hirschsprung&#x27;s disease  multiple surgeries        FAMILY HISTORY:  FH: heart disease  mother    FH: type 2 diabetes  mother        Social History:  Denies smoking use, drug use, alcohol use.   +occasional social alcohol use    Vital Signs Last 24 Hrs  T(C): 36.8 (16 Apr 2021 07:02), Max: 36.9 (15 Apr 2021 20:54)  T(F): 98.3 (16 Apr 2021 07:02), Max: 98.4 (15 Apr 2021 20:54)  HR: 65 (16 Apr 2021 07:02) (60 - 90)  BP: 119/53 (16 Apr 2021 07:02) (99/56 - 119/53)  BP(mean): --  RR: 18 (16 Apr 2021 07:02) (16 - 18)  SpO2: 100% (16 Apr 2021 07:02) (98% - 100%)    Physical Exam:   General: sitting comftorably in bed, NAD   HEENT: neck supple, full ROM  CV: RR S1S2 no m/r/g  Lungs: CTA b/l, good air flow b/l   Back: No CVA tenderness  Abd: Soft, non-tender, non-distended.  Bowel sounds present.    :  No bleeding on pad.    External labia wnl.  Bimanual exam with no cervical motion tenderness, uterus wnl, adnexa non palpable b/l.  Cervix closed vs. Cervix dilated    cm   Speculum Exam: No active bleeding from os.  Physiologic discharge.    Ext: non-tender b/l, no edema     LABS:                              11.1   8.13  )-----------( 295      ( 16 Apr 2021 04:20 )             34.2     04-16    136  |  102  |  8   ----------------------------<  120<H>  3.6   |  20<L>  |  0.58    Ca    9.2      16 Apr 2021 04:20    TPro  7.8  /  Alb  4.0  /  TBili  0.5  /  DBili  x   /  AST  93<H>  /  ALT  105<H>  /  AlkPhos  52  04-16    I&O's Detail    PT/INR - ( 16 Apr 2021 04:20 )   PT: 12.8 sec;   INR: 1.12 ratio         PTT - ( 16 Apr 2021 04:20 )  PTT:29.7 sec      RADIOLOGY & ADDITIONAL STUDIES:

## 2021-04-16 NOTE — DISCHARGE NOTE PROVIDER - NSDCCPCAREPLAN_GEN_ALL_CORE_FT
PRINCIPAL DISCHARGE DIAGNOSIS  Diagnosis: Nausea & vomiting  Assessment and Plan of Treatment: Nausea & vomiting

## 2021-04-16 NOTE — DISCHARGE NOTE PROVIDER - NSDCFUADDINST_GEN_ALL_CORE_FT
Regular diet as tolerated, regular activity as tolerated, no heavy lifting for first two weeks.  Nothing per vagina: no intercourse, tampons or douching.  Call your provider if you experience fevers, chills, worsening abdominal pain, inability to urinate or worsening vaginal bleeding.  Follow up with your provider in 2 weeks.

## 2021-04-16 NOTE — DISCHARGE NOTE PROVIDER - NSDCMRMEDTOKEN_GEN_ALL_CORE_FT
melatonin 3 mg oral tablet: 1 tab(s) orally once a day (at bedtime)  oxyCODONE 5 mg oral tablet: 1 tab(s) orally every 4 hours, As needed, Severe Pain (7 - 10) MDD:18mg  Prenatal Multivitamins: 1 tab(s) orally once a day last dose on 4/2/21   acetaminophen 325 mg oral tablet: 3 tab(s) orally every 6 hours, As needed, Mild Pain (1 - 3)  ibuprofen 600 mg oral tablet: 1 tab(s) orally every 6 hours, As needed, Moderate Pain (4 - 6)  melatonin 3 mg oral tablet: 1 tab(s) orally once a day (at bedtime)  oxyCODONE 5 mg oral tablet: 1 tab(s) orally every 4 hours, As needed, Severe Pain (7 - 10) MDD:18mg  Prenatal Multivitamins: 1 tab(s) orally once a day last dose on 4/2/21

## 2021-04-16 NOTE — H&P ADULT - ASSESSMENT
The patient is a 36YO POD 7 after ex-lap w/ midline vertical incision 2/2 significant bowel adhesions for a left salpingectomy. The patient has a history of post operative ileus requiring inpatient hospitalization for 10 days w/ an NGT. The patient was tolerating minimal PO at home and had nausea/vomting on presentation. Vitals stable and labs wnl.

## 2021-04-16 NOTE — DISCHARGE NOTE PROVIDER - CARE PROVIDER_API CALL
Goldy Wu (MD)  Obstetrics and Gynecology  [facility] [facility], [address] [place] [address]  Phone: [phone]  Fax: [phone]  Follow Up Time:

## 2021-04-16 NOTE — H&P ADULT - NSHPPHYSICALEXAM_GEN_ALL_CORE
Gen: NAD, A&Ox3  Abd: Midline vertical incision CDI w/ staples in place. Minimal lower abdominal tenderness around incision. ND. hyperactive bowel sounds. No guarding/rebound

## 2021-04-16 NOTE — ED ADULT NURSE REASSESSMENT NOTE - NS ED NURSE REASSESS COMMENT FT1
Pt awake, alert and oriented x 4 c/o abdominal bloating and gas; currently PO trialing with no nausea or vomiting.   Denies chest pain or SOB.   Respirations even and unlabored.

## 2021-04-16 NOTE — H&P ADULT - HISTORY OF PRESENT ILLNESS
HPI: 36yo PMH Hirschsprung's Dx, chronic salpingitis, POD#7 s/p ex-lap, LS, extensive GENO, repair of bowel serosal defect w/ general surgery, presents with chief concern of nausea/vomiting x1 day. She notes that she vomited 3 times last night, emesis containing food particles, NBNB. Patient denies abdominal pain, fevers, chills, chest pain, or SOB. On presentation to the ED she was experiencing lower abdominal pain, however this has since resolved. In the ED, she received antiemetic and underwent PO challenge. She passed the PO challenge but then threw up again. At the bedside, patient states that she is no longer nauseous. She is passing flatus and had a small BM while in the ED. She denies any constipation or diarrhea.    Name of GYN Physician: Dr. Wu    POB:     Pgyn: Denies fibroids, cysts, endometriosis, STI's, Abnormal pap smears     Home meds: [facility] Meds:   MEDICATIONS  (STANDING):    MEDICATIONS  (PRN):      Allergies    No Known Allergies    Intolerances        PAST MEDICAL & SURGICAL HISTORY:  Hirschsprung&#x27;s disease    Chronic salpingitis    DVT, lower extremity  left 2020 treated with eliquis    Anemia  iron infusions 2020    History of salpingectomy  Right    Hirschsprung&#x27;s disease  multiple surgeries        FAMILY HISTORY:  FH: heart disease  mother    FH: type 2 diabetes  mother        Social History:  Denies smoking use, drug use, alcohol use.   +occasional social alcohol use    Vital Signs Last 24 Hrs  T(C): 36.8 (16 Apr 2021 07:02), Max: 36.9 (15 Apr 2021 20:54)  T(F): 98.3 (16 Apr 2021 07:02), Max: 98.4 (15 Apr 2021 20:54)  HR: 65 (16 Apr 2021 07:02) (60 - 90)  BP: 119/53 (16 Apr 2021 07:02) (99/56 - 119/53)  BP(mean): --  RR: 18 (16 Apr 2021 07:02) (16 - 18)  SpO2: 100% (16 Apr 2021 07:02) (98% - 100%)    Physical Exam:   General: sitting comftorably in bed, NAD   HEENT: neck supple, full ROM  CV: RR S1S2 no m/r/g  Lungs: CTA b/l, good air flow b/l   Back: No CVA tenderness  Abd: Soft, non-tender, non-distended.  Bowel sounds present.    :  No bleeding on pad.    External labia wnl.  Bimanual exam with no cervical motion tenderness, uterus wnl, adnexa non palpable b/l.  Cervix closed vs. Cervix dilated    cm   Speculum Exam: No active bleeding from os.  Physiologic discharge.    Ext: non-tender b/l, no edema     LABS:                              11.1   8.13  )-----------( 295      ( 16 Apr 2021 04:20 )             34.2     04-16    136  |  102  |  8   ----------------------------<  120<H>  3.6   |  20<L>  |  0.58    Ca    9.2      16 Apr 2021 04:20    TPro  7.8  /  Alb  4.0  /  TBili  0.5  /  DBili  x   /  AST  93<H>  /  ALT  105<H>  /  AlkPhos  52  04-16    I&O's Detail    PT/INR - ( 16 Apr 2021 04:20 )   PT: 12.8 sec;   INR: 1.12 ratio         PTT - ( 16 Apr 2021 04:20 )  PTT:29.7 sec      RADIOLOGY & ADDITIONAL STUDIES:  < from: CT Abdomen and Pelvis w/ Oral Cont and w/ IV Cont (04.16.21 @ 05:04) >  EXAM:  CT ABDOMEN AND PELVIS OC IC        PROCEDURE DATE:  Apr 16 2021         INTERPRETATION:  CLINICAL INFORMATION: Abdominal distention, Hirschsprung's disease, history of salpingectomy and lysis of adhesions on 4/9/2021.    COMPARISON: CT abdomen pelvis from 2/6/2021    CONTRAST/COMPLICATIONS:  IV Contrast: Omnipaque 350  90 cc administered   10 cc discarded  Oral Contrast: Omnipaque 300 + Fruit 2o  Complications: None reported at time of study completion    PROCEDURE:  CT of the Abdomen and Pelvis was performed.  Sagittal and coronal reformats were performed.    FINDINGS:  LOWER CHEST: Minimal bilateral lower lobe subsegmental atelectasis.    LIVER: Within normal limits.  BILE DUCTS: Normal caliber.  GALLBLADDER: Within normal limits.  SPLEEN: Within normal limits.  PANCREAS: Within normal limits.  ADRENALS: Within normal limits.  KIDNEYS/URETERS: Bilateral subcentimeter hypodense lesions, too small to characterize. No hydronephrosis.    BLADDER: Mild bladder wall thickening with perivesicular haziness.  REPRODUCTIVE ORGANS: 3.4 cm left and 3.3 cm right adnexal cysts, unchanged. Uterus within normal limits. Nabothian cyst.    BOWEL: Wall thickening of several loops of small bowel within the left lower quadrant with dilatation of small bowel proximal to this level. Oral contrast passes distally into the colon. Postsurgical partial colectomy changes with rectal anastomosis is unchanged. Colon is again noted to be located within the right side of the abdomen.  PERITONEUM: Small small foci of pneumoperitoneum in the left pelvis, likely related to recent surgery. Ill-defined fluid within the lower abdomen with areas of peripheral enhancement and local measuring 3.8 x 2.3 cm in the right lower abdomen (601, 24).  VESSELS: Within normal limits. Again seen is a retroaortic left renal vein, a normal anatomic variant.  RETROPERITONEUM/LYMPH NODES: No lymphadenopathy.  ABDOMINAL WALL: Postsurgical changes.  BONES: Within normal limits.    IMPRESSION:  Wall thickening of several small bowel loops in the left lower quadrant with dilatation of small bowel proximal to this level. Oral contrast passes distally into the colon. Findings are suspicious for enteritis (which can be infectious, inflammatory or ischemic in etiology) with associated reactive ileus versus partial small bowel obstruction. Correlation with lactate is recommended.    Small foci of pneumoperitoneum likely related to recent surgery. Ill-defined free fluid within the lower abdomen with areas of loculationand peripheral enhancement, worrisome for phlegmon and possible developing abscess. No discrete drainable collection identified at this time.    Mild diffuse bladder wall thickening and perivesicular haziness, which may reflect cystitis. Correlation with urinalysis is recommended.    Unchanged bilateral adnexal cysts. Pelvic ultrasound may be obtained for further evaluation as clinically warranted.      < end of copied text >   Patient is requesting 90 days on lexapro and ibuprofen.

## 2021-04-16 NOTE — CONSULT NOTE ADULT - SUBJECTIVE AND OBJECTIVE BOX
A TEAM SURGERY CONSULT NOTE  RUTH CASEYSUSANRAMONA  |  [id number]  |  04-16-21 @ 03:08    CC: Patient is a 35y old  Female who presents with a chief complaint of nausea/vomiting     HPI: 35F PMH Hirschsprung, hydrosalpinx recently s/p diag lap converted to exlap, GENO, salpingectomy (w GYN) and repair of deserosalizations (Dr. Evangelista) on 4/9/21 presenting with nausea and vomiting. During recent hospitalization patient had return of bowel function and was discharged tolerating diet and having bowel function on POD4 (4/13). Patient is now representing with nausea and vomiting x3 episodes. Onset was the afternoon. Currently denies abdominal pain. Endorses passing flatus. Denies fevers, chills, diarrhea, constipation, CP/SOB.     INTERVAL EVENTS: In the ED, hemodynamically stable. Labs pending. CTAP w IV and PO contrast pending. General surgery consulted.     REVIEW OF SYSTEMS:  General: denies weight change, fever or fatigue  HEENT: denies sore throat, hoarseness  Respiratory: denies cough, shortness of breath at rest and on exertion, wheezing  Cardiovascular: denies chest pain, abnormal heart rhythm, PND, palpitations  Gastrointestinal: per HPI  Genitourinary: denies frequent urination, painful urination, kidney disease  Neurological: denies seizures, headaches  Muscoloskeletal: denies any joint pains  Psychiatric: denies depression, anxiety    PAST MEDICAL & SURGICAL HISTORY:  Hirschsprung's disease  Chronic salpingitis  DVT, lower extremity left 2020 treated with eliquis  Anemia  History of salpingectomy Right    Allergies: No Known Allergies    SOCIAL HISTORY: nonsmoker    FAMILY HISTORY: noncontributory    Objective:     Vital Signs Last 24 Hrs  T(C): 36.9 (15 Apr 2021 20:54), Max: 36.9 (15 Apr 2021 20:54)  T(F): 98.4 (15 Apr 2021 20:54), Max: 98.4 (15 Apr 2021 20:54)  HR: 90 (15 Apr 2021 23:43) (60 - 90)  BP: 113/61 (15 Apr 2021 23:43) (107/71 - 113/61)  RR: 16 (15 Apr 2021 23:43) (16 - 16)  SpO2: 99% (15 Apr 2021 23:43) (99% - 100%)    Physical Exam:  General: NAD, resting comfortably   CV: regular rate and rhythm   Pulm: no increased work of breathing   Abdomen: soft, nontender, minimally distended, no rebound or guarding, midline incision well healing    Extremities: warm and well perfused    LABS:                        12.2   9.01  )-----------( 266      ( 16 Apr 2021 01:08 )             37.6     04-16    133<L>  |  101  |  8   ----------------------------<  107<H>  5.8<H>   |  17<L>  |  0.48<L>    Ca    9.1      16 Apr 2021 01:08    TPro  7.8  /  Alb  4.0  /  TBili  0.5  /  DBili  x   /  AST  93<H>  /  ALT  105<H>  /  AlkPhos  52  04-16    PT/INR - ( 16 Apr 2021 01:08 )   PT: 12.3 sec;   INR: 1.08 ratio      PTT - ( 16 Apr 2021 01:08 )  PTT:29.3 sec  LIVER FUNCTIONS - ( 16 Apr 2021 01:08 )  Alb: 4.0 g/dL / Pro: 7.8 g/dL / ALK PHOS: 52 U/L / ALT: 105 U/L / AST: 93 U/L / GGT: x             RADIOLOGY & ADDITIONAL STUDIES:     A TEAM SURGERY CONSULT NOTE  RUTH CASEYSALOMÓNCEE  |  [id number]  |  04-16-21 @ 03:08    CC: Patient is a 35y old  Female who presents with a chief complaint of nausea/vomiting     HPI: 35F PMH Hirschsprung, hydrosalpinx recently s/p diag lap converted to exlap, GENO, salpingectomy (w GYN) and repair of deserosalizations (Dr. Evangelista) on 4/9/21 presenting with nausea and vomiting. During recent hospitalization patient had return of bowel function and was discharged tolerating diet and having bowel function on POD4 (4/13). Patient is now representing with nausea and vomiting x3 episodes. Onset was the afternoon. Currently denies abdominal pain. Endorses passing flatus. Denies fevers, chills, diarrhea, constipation, CP/SOB.     INTERVAL EVENTS: In the ED, hemodynamically stable. Labs pending. CTAP w IV and PO contrast with enteritis and lower pelvic fluid. Patient with bowel movement after contrast administration. General surgery consulted.     REVIEW OF SYSTEMS:  General: denies weight change, fever or fatigue  HEENT: denies sore throat, hoarseness  Respiratory: denies cough, shortness of breath at rest and on exertion, wheezing  Cardiovascular: denies chest pain, abnormal heart rhythm, PND, palpitations  Gastrointestinal: per HPI  Genitourinary: denies frequent urination, painful urination, kidney disease  Neurological: denies seizures, headaches  Muscoloskeletal: denies any joint pains  Psychiatric: denies depression, anxiety    PAST MEDICAL & SURGICAL HISTORY:  Hirschsprung's disease  Chronic salpingitis  DVT, lower extremity left 2020 treated with eliquis  Anemia  History of salpingectomy Right    Allergies: No Known Allergies    SOCIAL HISTORY: nonsmoker    FAMILY HISTORY: noncontributory    Objective:     Vital Signs Last 24 Hrs  T(C): 36.9 (15 Apr 2021 20:54), Max: 36.9 (15 Apr 2021 20:54)  T(F): 98.4 (15 Apr 2021 20:54), Max: 98.4 (15 Apr 2021 20:54)  HR: 90 (15 Apr 2021 23:43) (60 - 90)  BP: 113/61 (15 Apr 2021 23:43) (107/71 - 113/61)  RR: 16 (15 Apr 2021 23:43) (16 - 16)  SpO2: 99% (15 Apr 2021 23:43) (99% - 100%)    Physical Exam:  General: NAD, resting comfortably   CV: regular rate and rhythm   Pulm: no increased work of breathing   Abdomen: soft, nontender, minimally distended, no rebound or guarding, midline incision well healing    Extremities: warm and well perfused    LABS:                        12.2   9.01  )-----------( 266      ( 16 Apr 2021 01:08 )             37.6     04-16    133<L>  |  101  |  8   ----------------------------<  107<H>  5.8<H>   |  17<L>  |  0.48<L>    Ca    9.1      16 Apr 2021 01:08    TPro  7.8  /  Alb  4.0  /  TBili  0.5  /  DBili  x   /  AST  93<H>  /  ALT  105<H>  /  AlkPhos  52  04-16    PT/INR - ( 16 Apr 2021 01:08 )   PT: 12.3 sec;   INR: 1.08 ratio      PTT - ( 16 Apr 2021 01:08 )  PTT:29.3 sec  LIVER FUNCTIONS - ( 16 Apr 2021 01:08 )  Alb: 4.0 g/dL / Pro: 7.8 g/dL / ALK PHOS: 52 U/L / ALT: 105 U/L / AST: 93 U/L / GGT: x             RADIOLOGY & ADDITIONAL STUDIES:  < from: CT Abdomen and Pelvis w/ Oral Cont and w/ IV Cont (04.16.21 @ 05:04) >  FINDINGS:  LOWER CHEST: Minimal bilateral lower lobe subsegmental atelectasis.    LIVER: Within normal limits.  BILE DUCTS: Normal caliber.  GALLBLADDER: Within normal limits.  SPLEEN: Within normal limits.  PANCREAS: Within normal limits.  ADRENALS: Within normal limits.  KIDNEYS/URETERS: Bilateral subcentimeter hypodense lesions, too small to characterize. No hydronephrosis.    BLADDER: Mild bladder wall thickening with perivesicular haziness.  REPRODUCTIVE ORGANS: 3.4 cm left and 3.3 cm right adnexal cysts, unchanged. Uterus within normal limits. Nabothian cyst.    BOWEL: Wall thickening of several loops of small bowel within the left lower quadrant with dilatation of small bowel proximal to this level. Oral contrast passes distally into the colon. Postsurgical partial colectomy changes with rectal anastomosis is unchanged. Colon is again noted to be located within the right side of the abdomen.  PERITONEUM: Small small foci of pneumoperitoneum in the left pelvis, likely related to recent surgery. Ill-defined fluid within the lower abdomen with areas of peripheral enhancement and local measuring 3.8 x 2.3 cm in the right lower abdomen (601, 24).  VESSELS: Within normal limits. Again seen is a retroaortic left renal vein, a normal anatomic variant.  RETROPERITONEUM/LYMPH NODES: No lymphadenopathy.  ABDOMINAL WALL: Postsurgical changes.  BONES: Within normal limits.    IMPRESSION:  Wall thickening of several small bowel loops in the left lower quadrant with dilatation of small bowel proximal to this level. Oral contrast passes distally into the colon. Findings are suspicious for enteritis (which can be infectious, inflammatory or ischemic in etiology) with associated reactive ileus versus partial small bowel obstruction. Correlation with lactate is recommended.    Small foci of pneumoperitoneum likely related to recent surgery. Ill-defined free fluid within the lower abdomen with areas of loculationand peripheral enhancement, worrisome for phlegmon and possible developing abscess. No discrete drainable collection identified at this time.    Mild diffuse bladder wall thickening and perivesicular haziness, which may reflect cystitis. Correlation with urinalysis is recommended.    Unchanged bilateral adnexal cysts. Pelvic ultrasound may be obtained for further evaluation as clinically warranted.    < end of copied text >

## 2021-04-16 NOTE — CONSULT NOTE ADULT - ASSESSMENT
Assessment:  35F PMH Hirschsprung, hydrosalpinx recently s/p diag lap converted to exlap, GENO, salpingectomy (w GYN) and repair of deserosalizations (Dr. Evangelista) on 4/9/21, re-presenting with nausea and vomiting.     Recommendations:    Pending workup per ED    INCOMPLETE NOTE  Assessment:  35F PMH Hirschsprung, hydrosalpinx recently s/p diag lap converted to exlap, GENO, salpingectomy (w GYN) and repair of deserosalizations (Dr. Evangelista) on 4/9/21, re-presenting with abdominal pain and cramping.     Recommendations:  - patient CT noted for enteritis, as well as chronically dilated small bowel  - no surgical intervention indicated at this time as patient passing gas and having BM, not nauseated or vomiting  - recommend PO challenge in ED under supervision  - if patient passes challenge and gyn clears, no contraindication to discharge  - recommend gynecology consult for pelvic fluid collection  -  will need follow up with gynecology    Discussed with Dr. Darnell, colorectal fellow  A Team Surgery  [id number]

## 2021-04-16 NOTE — DISCHARGE NOTE PROVIDER - NSDCFUSCHEDAPPT_GEN_ALL_CORE_FT
RUTH CAMACHO ; 04/20/2021 ; NPP Surg [place] [address] [facility]  RUTH CAMACHO ; 04/22/2021 ; NPP OB/GYN [address] [facility] RUTH CAMACHO ; 04/22/2021 ; NPP OB/GYN [id number] [facility]

## 2021-04-16 NOTE — H&P ADULT - PROBLEM SELECTOR PLAN 1
Recs:   -Tylenol, Toradol for pain as needed  -Avoid opioids for constipation side effects  -Reglan/Zofran for nausea & Vomiting  -NPO   -LR@125  -HSQ  -Monitor VS  -AM CBC, BMP, Mg, Phos  -ADAT  -Gen surg consult if not improving    Lucía pgy3 d/w Dr. Wu

## 2021-04-16 NOTE — DISCHARGE NOTE PROVIDER - HOSPITAL COURSE
The patient was admitted POD7 s/p ex-lap for significant bowel adhesions for a left salpingectomy. She was nauseous/vomiting and failed PO challenge. CT scan showed dilated bowel and possible concern for SBO; however, clinical suspicion low as she was passing gas and having bowel movements. The patient was kept NPO. Vitals were stable. Her AM labs were normal and her diet was slowly transitioned. On HD# she was discharged in a stable condition and tolerating PO. The patient was admitted POD7 s/p ex-lap for significant bowel adhesions for a left salpingectomy. She was nauseous/vomiting and failed PO challenge. CT scan showed dilated bowel and possible concern for SBO; however, clinical suspicion low as she was passing gas and having bowel movements. The patient was kept NPO. Vitals were stable. Her AM labs were normal and her diet was slowly transitioned. On HD#7, incision staples were removed without difficulty, and patient was discharged in a stable condition, vital signs stable, tolerating PO, passing flatus and having regular BM. Patient to have close f/u with Dr. Wu.

## 2021-04-17 LAB
ANION GAP SERPL CALC-SCNC: 15 MMOL/L — HIGH (ref 7–14)
BASOPHILS # BLD AUTO: 0.01 K/UL — SIGNIFICANT CHANGE UP (ref 0–0.2)
BASOPHILS NFR BLD AUTO: 0.2 % — SIGNIFICANT CHANGE UP (ref 0–2)
BUN SERPL-MCNC: 9 MG/DL — SIGNIFICANT CHANGE UP (ref 7–23)
CALCIUM SERPL-MCNC: 9.1 MG/DL — SIGNIFICANT CHANGE UP (ref 8.4–10.5)
CHLORIDE SERPL-SCNC: 104 MMOL/L — SIGNIFICANT CHANGE UP (ref 98–107)
CO2 SERPL-SCNC: 20 MMOL/L — LOW (ref 22–31)
CREAT SERPL-MCNC: 0.6 MG/DL — SIGNIFICANT CHANGE UP (ref 0.5–1.3)
CULTURE RESULTS: SIGNIFICANT CHANGE UP
EOSINOPHIL # BLD AUTO: 0.09 K/UL — SIGNIFICANT CHANGE UP (ref 0–0.5)
EOSINOPHIL NFR BLD AUTO: 1.4 % — SIGNIFICANT CHANGE UP (ref 0–6)
GLUCOSE SERPL-MCNC: 108 MG/DL — HIGH (ref 70–99)
HCT VFR BLD CALC: 33 % — LOW (ref 34.5–45)
HGB BLD-MCNC: 10.5 G/DL — LOW (ref 11.5–15.5)
IANC: 4.4 K/UL — SIGNIFICANT CHANGE UP (ref 1.5–8.5)
IMM GRANULOCYTES NFR BLD AUTO: 0.3 % — SIGNIFICANT CHANGE UP (ref 0–1.5)
LYMPHOCYTES # BLD AUTO: 1.23 K/UL — SIGNIFICANT CHANGE UP (ref 1–3.3)
LYMPHOCYTES # BLD AUTO: 19 % — SIGNIFICANT CHANGE UP (ref 13–44)
MAGNESIUM SERPL-MCNC: 1.8 MG/DL — SIGNIFICANT CHANGE UP (ref 1.6–2.6)
MCHC RBC-ENTMCNC: 27.4 PG — SIGNIFICANT CHANGE UP (ref 27–34)
MCHC RBC-ENTMCNC: 31.8 GM/DL — LOW (ref 32–36)
MCV RBC AUTO: 86.2 FL — SIGNIFICANT CHANGE UP (ref 80–100)
MONOCYTES # BLD AUTO: 0.74 K/UL — SIGNIFICANT CHANGE UP (ref 0–0.9)
MONOCYTES NFR BLD AUTO: 11.4 % — SIGNIFICANT CHANGE UP (ref 2–14)
NEUTROPHILS # BLD AUTO: 4.4 K/UL — SIGNIFICANT CHANGE UP (ref 1.8–7.4)
NEUTROPHILS NFR BLD AUTO: 67.7 % — SIGNIFICANT CHANGE UP (ref 43–77)
NRBC # BLD: 0 /100 WBCS — SIGNIFICANT CHANGE UP
NRBC # FLD: 0 K/UL — SIGNIFICANT CHANGE UP
PHOSPHATE SERPL-MCNC: 3 MG/DL — SIGNIFICANT CHANGE UP (ref 2.5–4.5)
PLATELET # BLD AUTO: 287 K/UL — SIGNIFICANT CHANGE UP (ref 150–400)
POTASSIUM SERPL-MCNC: 3.7 MMOL/L — SIGNIFICANT CHANGE UP (ref 3.5–5.3)
POTASSIUM SERPL-SCNC: 3.7 MMOL/L — SIGNIFICANT CHANGE UP (ref 3.5–5.3)
RBC # BLD: 3.83 M/UL — SIGNIFICANT CHANGE UP (ref 3.8–5.2)
RBC # FLD: 14.9 % — HIGH (ref 10.3–14.5)
SODIUM SERPL-SCNC: 139 MMOL/L — SIGNIFICANT CHANGE UP (ref 135–145)
SPECIMEN SOURCE: SIGNIFICANT CHANGE UP
WBC # BLD: 6.49 K/UL — SIGNIFICANT CHANGE UP (ref 3.8–10.5)
WBC # FLD AUTO: 6.49 K/UL — SIGNIFICANT CHANGE UP (ref 3.8–10.5)

## 2021-04-17 RX ORDER — PANTOPRAZOLE SODIUM 20 MG/1
40 TABLET, DELAYED RELEASE ORAL ONCE
Refills: 0 | Status: COMPLETED | OUTPATIENT
Start: 2021-04-17 | End: 2021-04-17

## 2021-04-17 RX ADMIN — PANTOPRAZOLE SODIUM 40 MILLIGRAM(S): 20 TABLET, DELAYED RELEASE ORAL at 22:36

## 2021-04-17 RX ADMIN — ONDANSETRON 4 MILLIGRAM(S): 8 TABLET, FILM COATED ORAL at 23:50

## 2021-04-17 RX ADMIN — Medication 15 MILLIGRAM(S): at 11:17

## 2021-04-17 RX ADMIN — SODIUM CHLORIDE 125 MILLILITER(S): 9 INJECTION, SOLUTION INTRAVENOUS at 22:36

## 2021-04-17 RX ADMIN — HEPARIN SODIUM 5000 UNIT(S): 5000 INJECTION INTRAVENOUS; SUBCUTANEOUS at 06:45

## 2021-04-17 RX ADMIN — Medication 15 MILLIGRAM(S): at 11:32

## 2021-04-17 RX ADMIN — HEPARIN SODIUM 5000 UNIT(S): 5000 INJECTION INTRAVENOUS; SUBCUTANEOUS at 18:10

## 2021-04-17 RX ADMIN — FAMOTIDINE 20 MILLIGRAM(S): 10 INJECTION INTRAVENOUS at 11:09

## 2021-04-17 RX ADMIN — ONDANSETRON 4 MILLIGRAM(S): 8 TABLET, FILM COATED ORAL at 06:45

## 2021-04-17 RX ADMIN — SODIUM CHLORIDE 125 MILLILITER(S): 9 INJECTION, SOLUTION INTRAVENOUS at 15:00

## 2021-04-17 RX ADMIN — Medication 10 MILLIGRAM(S): at 11:14

## 2021-04-17 NOTE — PROGRESS NOTE ADULT - ASSESSMENT
35y Female patient S/P ex-lap, GENO, left salpingectomy with gyn. Patient is +/+ for GI fxn. Re-presenting with abdominal pain and cramping.     Plan:  - Care per Gynecology  - patient CT noted for enteritis, as well as chronically dilated small bowel  - no surgical intervention indicated at this time as patient passing gas and having BM, not nauseated or vomiting  - if patient passes challenge no contraindication to discharge  - will need follow up with gynecology and colorectal surgery    [facility]  [id number]

## 2021-04-17 NOTE — PROGRESS NOTE ADULT - SUBJECTIVE AND OBJECTIVE BOX
SURGERY PROGRESS NOTE    SUBJECTIVE / 24H EVENTS:  Patient seen and examined on morning rounds. No acute events overnight. Tolerated clear liquid diet yesterday, intermittent nausea treated with Zofran.      OBJECTIVE:  VITAL SIGNS:  ICU Vital Signs Last 24 Hrs  T(C): 36.5 (13 Apr 2021 07:06), Max: 37.2 (12 Apr 2021 14:00)  T(F): 97.7 (13 Apr 2021 07:06), Max: 98.9 (12 Apr 2021 14:00)  HR: 70 (13 Apr 2021 07:06) (65 - 87)  BP: 120/73 (13 Apr 2021 07:06) (100/55 - 128/77)  BP(mean): --  ABP: --  ABP(mean): --  RR: 17 (13 Apr 2021 07:06) (17 - 18)  SpO2: 98% (13 Apr 2021 07:06) (97% - 100%)      PHYSICAL EXAM:  Gen: NAD, resting comfortably.  LS: Respirations unlabored on RA, symmetric cw expansion  GI: softly distended, appropriately tender, surgical incisions are c/d/i.   Ext: Warm, well perfused, no edema.      04-11-21 @ 07:01  -  04-12-21 @ 07:00  --------------------------------------------------------  IN:    dextrose 5% + sodium chloride 0.45% w/ Additives: 2664 mL    IV PiggyBack: 500 mL  Total IN: 3164 mL    OUT:    Oral Fluid: 0 mL    Voided (mL): 1250 mL  Total OUT: 1250 mL    Total NET: 1914 mL      04-12-21 @ 07:01  -  04-13-21 @ 00:48  --------------------------------------------------------  IN:    dextrose 5% + sodium chloride 0.45% w/ Additives: 880 mL    IV PiggyBack: 50 mL    Oral Fluid: 360 mL  Total IN: 1290 mL    OUT:  Total OUT: 0 mL    Total NET: 1290 mL          LAB VALUES:              MEDICATIONS  (STANDING):  acetaminophen   Tablet .. 650 milliGRAM(s) Oral every 6 hours  enoxaparin Injectable 40 milliGRAM(s) SubCutaneous daily  melatonin 3 milliGRAM(s) Oral at bedtime  potassium phosphate / sodium phosphate Tablet (K-PHOS No. 2) 1 Tablet(s) Oral four times a day with meals    MEDICATIONS  (PRN):  benzocaine 15 mG/menthol 3.6 mG (Sugar-Free) Lozenge 1 Lozenge Oral every 3 hours PRN Sore Throat  naloxone Injectable 0.1 milliGRAM(s) IV Push every 3 minutes PRN For ANY of the following changes in patient status:  A. RR LESS THAN 10 breaths per minute, B. Oxygen saturation LESS THAN 90%, C. Sedation score of 6  oxyCODONE    IR 5 milliGRAM(s) Oral every 4 hours PRN Severe Pain (7 - 10)

## 2021-04-17 NOTE — PROGRESS NOTE ADULT - SUBJECTIVE AND OBJECTIVE BOX
GYN Progress Note      POD#8   HD#2    Patient seen and examined at bedside.  No acute events overnight.  Feeling improved from arrival to ED.  Patient reports one episode of minimal vomiting late last evening.  Nausea overnight which responds to zofran.  Patient tolerated iced chips but has not tried drinking water.  Reports no appetite.  Pain well controlled.  Patient is ambulating and showering.  Patient states that she does not pass gas at baseline.    Voiding spontaneously.  Denies CP, SOB, fevers, and chills.    Vital Signs Last 24 Hours  T(C): 36.8 (04-17-21 @ 05:49), Max: 37.8 (04-16-21 @ 21:05)  HR: 56 (04-17-21 @ 05:49) (56 - 74)  BP: 99/63 (04-17-21 @ 05:49) (98/63 - 120/63)  RR: 15 (04-17-21 @ 05:49) (15 - 16)  SpO2: 100% (04-17-21 @ 05:49) (99% - 100%)    I&O's Summary      Physical Exam:  General: NAD  CV: RRR  Lungs: CTAB  Abdomen: soft, nontender, BS+  : no bleeding on pad  Ext: no pain or swelling     Labs:                        10.5   6.49  )-----------( 287      ( 17 Apr 2021 06:51 )             33.0   baso 0.2    eos 1.4    imm gran 0.3    lymph 19.0   mono 11.4   poly 67.7                         11.1   8.13  )-----------( 295      ( 16 Apr 2021 04:20 )             34.2   baso x      eos x      imm gran x      lymph x      mono x      poly x                            12.2   9.01  )-----------( 266      ( 16 Apr 2021 01:08 )             37.6   baso 0.2    eos 0.2    imm gran 0.3    lymph 8.5    mono 5.7    poly 85.1       MEDICATIONS  (STANDING):  famotidine Injectable 20 milliGRAM(s) IV Push daily  heparin   Injectable 5000 Unit(s) SubCutaneous every 12 hours  lactated ringers. 1000 milliLiter(s) (125 mL/Hr) IV Continuous <Continuous>    MEDICATIONS  (PRN):  acetaminophen   Tablet .. 975 milliGRAM(s) Oral every 6 hours PRN Mild Pain (1 - 3)  ketorolac   Injectable 15 milliGRAM(s) IV Push every 6 hours PRN Severe Pain (7 - 10)  metoclopramide Injectable 10 milliGRAM(s) IV Push every 6 hours PRN Nausea/vomiting  ondansetron Injectable 4 milliGRAM(s) IV Push every 6 hours PRN Nausea and/or Vomiting      A/P: 35y POD#__ s/p __.  Patient is stable and doing well.      Neuro: PO pain meds.   CV: Hemodynamically stable  Pulm: Saturating well on room air, encourage oob/amb  GI: Advance to regular diet vs. Continue regular diet  : UOP adequate, d/c yates   vs. Voiding spontanously   Heme: c/w HSQ and SCDs for DVT ppx  FEN: LR@125.  replete electrolytes prn   ID: Afebrile  Endo: No active issues   Dispo: Continue routine post-op care    Albert Huang, PGY1 GYN Progress Note      POD#8   HD#2    Patient seen and examined at bedside.  No acute events overnight.  Feeling improved from arrival to ED.  Patient reports one episode of minimal vomiting late last evening.  Nausea overnight which responds to zofran.  Patient tolerated iced chips but has not tried drinking water.  Reports no appetite.  Pain well controlled.  Patient is ambulating and showering.  Patient states that she does not pass gas at baseline unless actively having a bowel movement.    Voiding spontaneously.  Denies CP, SOB, fevers, and chills.    Vital Signs Last 24 Hours  T(C): 36.8 (04-17-21 @ 05:49), Max: 37.8 (04-16-21 @ 21:05)  HR: 56 (04-17-21 @ 05:49) (56 - 74)  BP: 99/63 (04-17-21 @ 05:49) (98/63 - 120/63)  RR: 15 (04-17-21 @ 05:49) (15 - 16)  SpO2: 100% (04-17-21 @ 05:49) (99% - 100%)    I&O's Summary      Physical Exam:  General: NAD  CV: RRR  Lungs: CTAB  Abdomen: soft, nontender, BS+  : no bleeding on pad  Ext: no pain or swelling     Labs:                        10.5   6.49  )-----------( 287      ( 17 Apr 2021 06:51 )             33.0   baso 0.2    eos 1.4    imm gran 0.3    lymph 19.0   mono 11.4   poly 67.7                         11.1   8.13  )-----------( 295      ( 16 Apr 2021 04:20 )             34.2   baso x      eos x      imm gran x      lymph x      mono x      poly x                            12.2   9.01  )-----------( 266      ( 16 Apr 2021 01:08 )             37.6   baso 0.2    eos 0.2    imm gran 0.3    lymph 8.5    mono 5.7    poly 85.1       MEDICATIONS  (STANDING):  famotidine Injectable 20 milliGRAM(s) IV Push daily  heparin   Injectable 5000 Unit(s) SubCutaneous every 12 hours  lactated ringers. 1000 milliLiter(s) (125 mL/Hr) IV Continuous <Continuous>    MEDICATIONS  (PRN):  acetaminophen   Tablet .. 975 milliGRAM(s) Oral every 6 hours PRN Mild Pain (1 - 3)  ketorolac   Injectable 15 milliGRAM(s) IV Push every 6 hours PRN Severe Pain (7 - 10)  metoclopramide Injectable 10 milliGRAM(s) IV Push every 6 hours PRN Nausea/vomiting  ondansetron Injectable 4 milliGRAM(s) IV Push every 6 hours PRN Nausea and/or Vomiting

## 2021-04-17 NOTE — PROGRESS NOTE ADULT - ASSESSMENT
34yo F POD#8 after ex-lap w/ midline vertical incision 2/2 significant bowel adhesions for a left salpingectomy. The patient has a history of post operative ileus requiring inpatient hospitalization for 10 days w/ an NGT. The patient was tolerating minimal PO at home and had nausea/vomiting on presentation. Recieved zofran overnight with improvement in nausea.  Patient not yet attempting PO intake.    Plan: Recs:   -Tylenol, Toradol for pain as needed  -Avoid opioids for constipation side effects  -Reglan/Zofran for nausea & Vomiting  -NPO  -LR@125  -HSQ  -Monitor VS  -AM CBC, BMP, Mg, Phos  -ADAT  -Gen surg consult if not improving 34yo F POD#8 after ex-lap w/ midline vertical incision 2/2 significant bowel adhesions for a left salpingectomy. The patient has a history of post operative ileus requiring inpatient hospitalization for 10 days w/ an NGT. The patient was tolerating minimal PO at home and had nausea/vomiting on presentation. Recieved zofran overnight with improvement in nausea.  Patient not yet attempting PO intake.    Plan:  -Tylenol, Toradol for pain as needed  -Avoid opioids for constipation side effects  -Reglan/Zofran for nausea & Vomiting  -NPO  -LR@125  -HSQ  -Monitor VS  -AM CBC, BMP, Mg, Phos  -ADAT  -Gen surg consult if not improving

## 2021-04-18 LAB
ANION GAP SERPL CALC-SCNC: 13 MMOL/L — SIGNIFICANT CHANGE UP (ref 7–14)
BUN SERPL-MCNC: 9 MG/DL — SIGNIFICANT CHANGE UP (ref 7–23)
CALCIUM SERPL-MCNC: 8.4 MG/DL — SIGNIFICANT CHANGE UP (ref 8.4–10.5)
CHLORIDE SERPL-SCNC: 103 MMOL/L — SIGNIFICANT CHANGE UP (ref 98–107)
CO2 SERPL-SCNC: 22 MMOL/L — SIGNIFICANT CHANGE UP (ref 22–31)
CREAT SERPL-MCNC: 0.55 MG/DL — SIGNIFICANT CHANGE UP (ref 0.5–1.3)
GLUCOSE SERPL-MCNC: 88 MG/DL — SIGNIFICANT CHANGE UP (ref 70–99)
HCT VFR BLD CALC: 27.7 % — LOW (ref 34.5–45)
HCT VFR BLD CALC: 33.3 % — LOW (ref 34.5–45)
HGB BLD-MCNC: 10.7 G/DL — LOW (ref 11.5–15.5)
HGB BLD-MCNC: 8.8 G/DL — LOW (ref 11.5–15.5)
MAGNESIUM SERPL-MCNC: 1.7 MG/DL — SIGNIFICANT CHANGE UP (ref 1.6–2.6)
MCHC RBC-ENTMCNC: 27.6 PG — SIGNIFICANT CHANGE UP (ref 27–34)
MCHC RBC-ENTMCNC: 31.8 GM/DL — LOW (ref 32–36)
MCV RBC AUTO: 86.8 FL — SIGNIFICANT CHANGE UP (ref 80–100)
NRBC # BLD: 0 /100 WBCS — SIGNIFICANT CHANGE UP
NRBC # FLD: 0 K/UL — SIGNIFICANT CHANGE UP
PHOSPHATE SERPL-MCNC: 3.2 MG/DL — SIGNIFICANT CHANGE UP (ref 2.5–4.5)
PLATELET # BLD AUTO: 222 K/UL — SIGNIFICANT CHANGE UP (ref 150–400)
POTASSIUM SERPL-MCNC: 3.6 MMOL/L — SIGNIFICANT CHANGE UP (ref 3.5–5.3)
POTASSIUM SERPL-SCNC: 3.6 MMOL/L — SIGNIFICANT CHANGE UP (ref 3.5–5.3)
RBC # BLD: 3.19 M/UL — LOW (ref 3.8–5.2)
RBC # FLD: 14.5 % — SIGNIFICANT CHANGE UP (ref 10.3–14.5)
SODIUM SERPL-SCNC: 138 MMOL/L — SIGNIFICANT CHANGE UP (ref 135–145)
WBC # BLD: 5.57 K/UL — SIGNIFICANT CHANGE UP (ref 3.8–10.5)
WBC # FLD AUTO: 5.57 K/UL — SIGNIFICANT CHANGE UP (ref 3.8–10.5)

## 2021-04-18 RX ORDER — SIMETHICONE 80 MG/1
80 TABLET, CHEWABLE ORAL THREE TIMES A DAY
Refills: 0 | Status: DISCONTINUED | OUTPATIENT
Start: 2021-04-18 | End: 2021-04-22

## 2021-04-18 RX ORDER — CITRIC ACID/SODIUM CITRATE 300-500 MG
30 SOLUTION, ORAL ORAL EVERY 24 HOURS
Refills: 0 | Status: DISCONTINUED | OUTPATIENT
Start: 2021-04-18 | End: 2021-04-22

## 2021-04-18 RX ORDER — FAMOTIDINE 10 MG/ML
20 INJECTION INTRAVENOUS EVERY 12 HOURS
Refills: 0 | Status: DISCONTINUED | OUTPATIENT
Start: 2021-04-18 | End: 2021-04-19

## 2021-04-18 RX ORDER — CALCIUM CARBONATE 500(1250)
1 TABLET ORAL EVERY 4 HOURS
Refills: 0 | Status: DISCONTINUED | OUTPATIENT
Start: 2021-04-18 | End: 2021-04-22

## 2021-04-18 RX ADMIN — SODIUM CHLORIDE 125 MILLILITER(S): 9 INJECTION, SOLUTION INTRAVENOUS at 06:46

## 2021-04-18 RX ADMIN — SIMETHICONE 80 MILLIGRAM(S): 80 TABLET, CHEWABLE ORAL at 14:31

## 2021-04-18 RX ADMIN — Medication 15 MILLIGRAM(S): at 21:08

## 2021-04-18 RX ADMIN — Medication 30 MILLILITER(S): at 12:01

## 2021-04-18 RX ADMIN — SODIUM CHLORIDE 125 MILLILITER(S): 9 INJECTION, SOLUTION INTRAVENOUS at 21:04

## 2021-04-18 RX ADMIN — FAMOTIDINE 20 MILLIGRAM(S): 10 INJECTION INTRAVENOUS at 10:18

## 2021-04-18 RX ADMIN — Medication 1 TABLET(S): at 18:45

## 2021-04-18 RX ADMIN — HEPARIN SODIUM 5000 UNIT(S): 5000 INJECTION INTRAVENOUS; SUBCUTANEOUS at 06:06

## 2021-04-18 RX ADMIN — SIMETHICONE 80 MILLIGRAM(S): 80 TABLET, CHEWABLE ORAL at 21:05

## 2021-04-18 RX ADMIN — Medication 1 TABLET(S): at 14:45

## 2021-04-18 RX ADMIN — FAMOTIDINE 20 MILLIGRAM(S): 10 INJECTION INTRAVENOUS at 21:05

## 2021-04-18 RX ADMIN — HEPARIN SODIUM 5000 UNIT(S): 5000 INJECTION INTRAVENOUS; SUBCUTANEOUS at 18:09

## 2021-04-18 RX ADMIN — Medication 15 MILLIGRAM(S): at 21:25

## 2021-04-18 NOTE — PROGRESS NOTE ADULT - ASSESSMENT
35y Female patient S/P ex-lap, GENO, left salpingectomy with gyn. Patient is +/+ for GI fxn. Re-presenting with abdominal pain and cramping.     Plan:  - Care per Gynecology  - patient CT noted for enteritis, as well as chronically dilated small bowel  - no surgical intervention indicated at this time  - no contraindication to discharge if patient feels comfortable with going home  - will need follow up with gynecology and colorectal surgery    [facility]  [id number]

## 2021-04-18 NOTE — CHART NOTE - NSCHARTNOTEFT_GEN_A_CORE
R4 GYN Chart Note (late entry due to clinical duties)    Called by pt's nurse as pt was c/o of severe epigastric pain not relieved with pain medication.    Pt evaluated at bedside. Pt states that she feels "terrible gas pain" predominantly in the epigastric region. Pt ate pancakes this AM but then felt gas and dyspepsia and vomited x 1. Pt denies nausea and only felt the urge to vomit 2/2 to pain. Pt states that pepcid relieves her symptoms and her dosing was increased to pepcid 20 mg IVP BID. Pt attempted to drink Bicitra but that did not relieve her gas pain. Pt states that she feels that her gas pain and dyspepsia is due to a traumatic removal of her NGT from her prior admission. Pt continues to pass flatus today.     PE:   Gen: AAOx3  CB: RRR  Pulm CTA bl/l   Abd: soft, appropriately tender, no rebount or guarding  Incision: vertical midline incision with staples. no erythema, edema, or incisional drainage    Plan  -H/H noted to be 8.8/27.7 this am. Repeat H/h 10.7/33.3  -Ordered TUMS and simethicone for dyspepsia and gas.   -Encouraged PO intake (crackers) to see how she feels. ADAT  -Likely not SBO given that pt is passing flatus and having BM. More likely resolving ileus vs. gastroenteritis.  -Encouraged ambulation and OOB into charge.   -Will re-eval in PM    EDIE Ospina PGY4

## 2021-04-18 NOTE — PROGRESS NOTE ADULT - SUBJECTIVE AND OBJECTIVE BOX
GYN Progress Note      POD#9   HD#3    Patient seen and examined at bedside.Feeling improved from arrival to ED.  Patient had tea last night without any issue.  When she had jello right before sleep she reports 1 episode of vomiting.  No baseline nausea or vomiting.  Patient had additional bowel movement last night with passage of gas.  Overall feels improved however still not tolerating PO intake.  Patient is ambulating and showering.    Voiding spontaneously.  Denies CP, SOB, fevers, and chills.    Vital Signs Last 24 Hours  T(C): 37.1 (04-18-21 @ 06:05), Max: 37.1 (04-17-21 @ 16:30)  HR: 88 (04-18-21 @ 06:05) (62 - 96)  BP: 111/63 (04-18-21 @ 06:05) (100/53 - 120/56)  RR: 16 (04-18-21 @ 06:05) (16 - 18)  SpO2: 97% (04-18-21 @ 06:05) (97% - 100%)    I&O's Summary    17 Apr 2021 07:01  -  18 Apr 2021 06:58  --------------------------------------------------------  IN: 1740 mL / OUT: 600 mL / NET: 1140 mL        Physical Exam:  General: NAD  CV: RRR  Lungs: CTAB  Abdomen: soft, mildly tender LLQ>RLQ, softly distended, BS+  Incision: midline vertical CDI w/ staples  : no bleeding on pad  Ext: no pain or swelling     Labs:                        8.8    5.57  )-----------( 222      ( 18 Apr 2021 06:25 )             27.7   baso x      eos x      imm gran x      lymph x      mono x      poly x                            10.5   6.49  )-----------( 287      ( 17 Apr 2021 06:51 )             33.0   baso 0.2    eos 1.4    imm gran 0.3    lymph 19.0   mono 11.4   poly 67.7                         11.1   8.13  )-----------( 295      ( 16 Apr 2021 04:20 )             34.2   baso x      eos x      imm gran x      lymph x      mono x      poly x                            12.2   9.01  )-----------( 266      ( 16 Apr 2021 01:08 )             37.6   baso 0.2    eos 0.2    imm gran 0.3    lymph 8.5    mono 5.7    poly 85.1       MEDICATIONS  (STANDING):  famotidine Injectable 20 milliGRAM(s) IV Push daily  heparin   Injectable 5000 Unit(s) SubCutaneous every 12 hours  lactated ringers. 1000 milliLiter(s) (125 mL/Hr) IV Continuous <Continuous>    MEDICATIONS  (PRN):  acetaminophen   Tablet .. 975 milliGRAM(s) Oral every 6 hours PRN Mild Pain (1 - 3)  ketorolac   Injectable 15 milliGRAM(s) IV Push every 6 hours PRN Severe Pain (7 - 10)  metoclopramide Injectable 10 milliGRAM(s) IV Push every 6 hours PRN Nausea/vomiting  ondansetron Injectable 4 milliGRAM(s) IV Push every 6 hours PRN Nausea and/or Vomiting

## 2021-04-18 NOTE — PROGRESS NOTE ADULT - ASSESSMENT
36yo F POD#9 HD#3 after ex-lap w/ midline vertical incision 2/2 significant bowel adhesions for a left salpingectomy. The patient has a history of post operative ileus requiring inpatient hospitalization for 10 days w/ an NGT. The patient was tolerating minimal PO at home and had nausea/vomiting on presentation.  Yesterday had two small bowel movements and passage of flatus.  Tolerated tea overnight but not jello.    Plan:  -Tylenol, Toradol for pain as needed  -Avoid opioids for constipation side effects  -Reglan/Zofran for nausea & Vomiting  -Pepcid  -CLD, ADAT  -LR@125  -HSQ  -Monitor VS  -AM CBC, BMP, Mg, Phos

## 2021-04-19 LAB
ANION GAP SERPL CALC-SCNC: 12 MMOL/L — SIGNIFICANT CHANGE UP (ref 7–14)
BASOPHILS # BLD AUTO: 0.01 K/UL — SIGNIFICANT CHANGE UP (ref 0–0.2)
BASOPHILS NFR BLD AUTO: 0.2 % — SIGNIFICANT CHANGE UP (ref 0–2)
BLD GP AB SCN SERPL QL: NEGATIVE — SIGNIFICANT CHANGE UP
BUN SERPL-MCNC: <2 MG/DL — LOW (ref 7–23)
CALCIUM SERPL-MCNC: 8.8 MG/DL — SIGNIFICANT CHANGE UP (ref 8.4–10.5)
CHLORIDE SERPL-SCNC: 105 MMOL/L — SIGNIFICANT CHANGE UP (ref 98–107)
CO2 SERPL-SCNC: 23 MMOL/L — SIGNIFICANT CHANGE UP (ref 22–31)
CREAT SERPL-MCNC: 0.53 MG/DL — SIGNIFICANT CHANGE UP (ref 0.5–1.3)
EOSINOPHIL # BLD AUTO: 0.03 K/UL — SIGNIFICANT CHANGE UP (ref 0–0.5)
EOSINOPHIL NFR BLD AUTO: 0.6 % — SIGNIFICANT CHANGE UP (ref 0–6)
GLUCOSE SERPL-MCNC: 84 MG/DL — SIGNIFICANT CHANGE UP (ref 70–99)
HCT VFR BLD CALC: 30.7 % — LOW (ref 34.5–45)
HGB BLD-MCNC: 9.7 G/DL — LOW (ref 11.5–15.5)
IANC: 3.15 K/UL — SIGNIFICANT CHANGE UP (ref 1.5–8.5)
IMM GRANULOCYTES NFR BLD AUTO: 0.2 % — SIGNIFICANT CHANGE UP (ref 0–1.5)
LYMPHOCYTES # BLD AUTO: 1.28 K/UL — SIGNIFICANT CHANGE UP (ref 1–3.3)
LYMPHOCYTES # BLD AUTO: 25.4 % — SIGNIFICANT CHANGE UP (ref 13–44)
MAGNESIUM SERPL-MCNC: 1.7 MG/DL — SIGNIFICANT CHANGE UP (ref 1.6–2.6)
MCHC RBC-ENTMCNC: 27.3 PG — SIGNIFICANT CHANGE UP (ref 27–34)
MCHC RBC-ENTMCNC: 31.6 GM/DL — LOW (ref 32–36)
MCV RBC AUTO: 86.5 FL — SIGNIFICANT CHANGE UP (ref 80–100)
MONOCYTES # BLD AUTO: 0.55 K/UL — SIGNIFICANT CHANGE UP (ref 0–0.9)
MONOCYTES NFR BLD AUTO: 10.9 % — SIGNIFICANT CHANGE UP (ref 2–14)
NEUTROPHILS # BLD AUTO: 3.15 K/UL — SIGNIFICANT CHANGE UP (ref 1.8–7.4)
NEUTROPHILS NFR BLD AUTO: 62.7 % — SIGNIFICANT CHANGE UP (ref 43–77)
NRBC # BLD: 0 /100 WBCS — SIGNIFICANT CHANGE UP
NRBC # FLD: 0 K/UL — SIGNIFICANT CHANGE UP
PHOSPHATE SERPL-MCNC: 3.5 MG/DL — SIGNIFICANT CHANGE UP (ref 2.5–4.5)
PLATELET # BLD AUTO: 252 K/UL — SIGNIFICANT CHANGE UP (ref 150–400)
POTASSIUM SERPL-MCNC: 3.6 MMOL/L — SIGNIFICANT CHANGE UP (ref 3.5–5.3)
POTASSIUM SERPL-SCNC: 3.6 MMOL/L — SIGNIFICANT CHANGE UP (ref 3.5–5.3)
RBC # BLD: 3.55 M/UL — LOW (ref 3.8–5.2)
RBC # FLD: 14.6 % — HIGH (ref 10.3–14.5)
RH IG SCN BLD-IMP: POSITIVE — SIGNIFICANT CHANGE UP
SODIUM SERPL-SCNC: 140 MMOL/L — SIGNIFICANT CHANGE UP (ref 135–145)
WBC # BLD: 5.03 K/UL — SIGNIFICANT CHANGE UP (ref 3.8–10.5)
WBC # FLD AUTO: 5.03 K/UL — SIGNIFICANT CHANGE UP (ref 3.8–10.5)

## 2021-04-19 PROCEDURE — 99222 1ST HOSP IP/OBS MODERATE 55: CPT | Mod: GC

## 2021-04-19 RX ORDER — PANTOPRAZOLE SODIUM 20 MG/1
40 TABLET, DELAYED RELEASE ORAL DAILY
Refills: 0 | Status: DISCONTINUED | OUTPATIENT
Start: 2021-04-19 | End: 2021-04-22

## 2021-04-19 RX ORDER — IBUPROFEN 200 MG
600 TABLET ORAL EVERY 6 HOURS
Refills: 0 | Status: DISCONTINUED | OUTPATIENT
Start: 2021-04-19 | End: 2021-04-22

## 2021-04-19 RX ORDER — FAMOTIDINE 10 MG/ML
20 INJECTION INTRAVENOUS
Refills: 0 | Status: DISCONTINUED | OUTPATIENT
Start: 2021-04-19 | End: 2021-04-22

## 2021-04-19 RX ADMIN — Medication 600 MILLIGRAM(S): at 11:41

## 2021-04-19 RX ADMIN — SIMETHICONE 80 MILLIGRAM(S): 80 TABLET, CHEWABLE ORAL at 13:12

## 2021-04-19 RX ADMIN — Medication 10 MILLIGRAM(S): at 17:03

## 2021-04-19 RX ADMIN — SODIUM CHLORIDE 125 MILLILITER(S): 9 INJECTION, SOLUTION INTRAVENOUS at 05:31

## 2021-04-19 RX ADMIN — Medication 600 MILLIGRAM(S): at 12:41

## 2021-04-19 RX ADMIN — ONDANSETRON 4 MILLIGRAM(S): 8 TABLET, FILM COATED ORAL at 13:13

## 2021-04-19 RX ADMIN — FAMOTIDINE 20 MILLIGRAM(S): 10 INJECTION INTRAVENOUS at 22:08

## 2021-04-19 RX ADMIN — HEPARIN SODIUM 5000 UNIT(S): 5000 INJECTION INTRAVENOUS; SUBCUTANEOUS at 05:31

## 2021-04-19 RX ADMIN — Medication 1 TABLET(S): at 13:12

## 2021-04-19 RX ADMIN — SODIUM CHLORIDE 75 MILLILITER(S): 9 INJECTION, SOLUTION INTRAVENOUS at 13:08

## 2021-04-19 RX ADMIN — FAMOTIDINE 20 MILLIGRAM(S): 10 INJECTION INTRAVENOUS at 09:40

## 2021-04-19 RX ADMIN — HEPARIN SODIUM 5000 UNIT(S): 5000 INJECTION INTRAVENOUS; SUBCUTANEOUS at 17:03

## 2021-04-19 RX ADMIN — SIMETHICONE 80 MILLIGRAM(S): 80 TABLET, CHEWABLE ORAL at 05:31

## 2021-04-19 RX ADMIN — Medication 1 TABLET(S): at 17:13

## 2021-04-19 RX ADMIN — SIMETHICONE 80 MILLIGRAM(S): 80 TABLET, CHEWABLE ORAL at 22:08

## 2021-04-19 NOTE — PROGRESS NOTE ADULT - PROBLEM SELECTOR PLAN 1
Neuro: c/w tylenol. Toradol PRN for severe pain.  CV: VSS, CBC in AM  Pulm: Saturating well on room air, encourage incentive spirometry  GI: Will advance to low res regular diet this AM.  -Will consider GI consult for symptoms of severe dyspepsia  -WIll transition to PO pepcid  -C/w simethicone  -C/w TUMS  -bicitra PRN  -Zofran/reglan PRN N/V  FEN: LR decreased to 75cc/hr, will SLIV when tolerating reg diet  : voiding spontaneously  Heme: HSQ and SCDs for DVT ppx  Dispo: will encourage PO, if able to tolerate breakfast and lunch, d/c planning.    EDIE Ospina PGY4 Neuro: c/w tylenol. Toradol PRN for severe pain.  CV: VSS, CBC in AM  Pulm: Saturating well on room air, encourage incentive spirometry  GI: Will advance to low res regular diet this AM.  -Will consider GI consult for symptoms of severe dyspepsia  -WIll transition to PO pepcid  -Add maalox  -C/w simethicone  -C/w TUMS  -bicitra PRN  -Zofran/reglan PRN N/V  FEN: LR decreased to 75cc/hr, will SLIV when tolerating reg diet  : voiding spontaneously  Heme: HSQ and SCDs for DVT ppx  Dispo: will encourage PO, if able to tolerate breakfast and lunch, d/c planning.    EDIE Ospina PGY4

## 2021-04-19 NOTE — CONSULT NOTE ADULT - ASSESSMENT
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       IMPRESSION:   #N/V, epigastric burning: pt with on-going nausea, vomiting and epigastric burning after recent surgery. Found to have CT a/p showing dilated loops of small bowel + c/f possible phlegmon vs developing abscess. Symptoms likely 2/2 prolonged post-op ileus vs CT findings may be 2/2 reactive changes after surgery vs would continue to monitor for developing phlegmon/abscess. Afebrile, without leukocytosis or diarrhea, clinically less likely to be 2/2 gastroenteritis at this time.       RECOMMENDATIONS:   - Would not advance diet further than clears/liquids at this time  - Encourage bowel rest   - NGT for decompression w/ NPO if persistent n/v later today/tomorrow   - Encouraged ambulation   - Monitor electrolytes, maintain within normal range to avoid prolonging ileus   - For nausea: zofran  - For heartburn: PPI 40mg daily, pepcid 20mg BID         Thank you for involving us in the care of this patient, please reach out if any further questions.     Zoltan Grossman MD  Gastroenterology Fellow, PGY4    Available on [employer] Teams  [phone] ([facility])  [id number] ([facility])  Please contact on call fellow weekdays after 5pm-7am and weekends: [phone]

## 2021-04-19 NOTE — PROGRESS NOTE ADULT - SUBJECTIVE AND OBJECTIVE BOX
R4 Gyn Progress Note POD#10  HD#4    Subjective:   Pt seen and examined at bedside. No events overnight. Pt able to tolerate 2 crackers last night, however epigastric pain returned. Pain relieved with Toradol x 1. Pain and gas only associated with fluid or oral intake. Patient ambulating. Last passed flatus yesterday morning. Pt had 1 episode of chills last night but no fever. Pt denies fever, chest pain, SOB, nausea, vomiting, lightheadedness, dizziness.      Objective:  T(F): 98.7 (04-19-21 @ 05:30), Max: 99.6 (04-18-21 @ 21:27)  HR: 64 (04-19-21 @ 05:30) (55 - 82)  BP: 107/68 (04-19-21 @ 05:30) (107/68 - 122/66)  RR: 16 (04-19-21 @ 05:30) (16 - 18)  SpO2: 97% (04-19-21 @ 05:30) (97% - 100%)  Wt(kg): --  I&O's Summary    17 Apr 2021 07:01  -  18 Apr 2021 07:00  --------------------------------------------------------  IN: 1740 mL / OUT: 600 mL / NET: 1140 mL    18 Apr 2021 07:01  -  19 Apr 2021 06:49  --------------------------------------------------------  IN: 1620 mL / OUT: 650 mL / NET: 970 mL        MEDICATIONS  (STANDING):  famotidine Injectable 20 milliGRAM(s) IV Push every 12 hours  heparin   Injectable 5000 Unit(s) SubCutaneous every 12 hours  lactated ringers. 1000 milliLiter(s) (75 mL/Hr) IV Continuous <Continuous>  simethicone 80 milliGRAM(s) Chew three times a day    MEDICATIONS  (PRN):  acetaminophen   Tablet .. 975 milliGRAM(s) Oral every 6 hours PRN Mild Pain (1 - 3)  calcium carbonate    500 mG (Tums) Chewable 1 Tablet(s) Chew every 4 hours PRN Gas  citric acid/sodium citrate Solution 30 milliLiter(s) Oral every 24 hours PRN acid reflux  ketorolac   Injectable 15 milliGRAM(s) IV Push every 6 hours PRN Severe Pain (7 - 10)  metoclopramide Injectable 10 milliGRAM(s) IV Push every 6 hours PRN Nausea/vomiting  ondansetron Injectable 4 milliGRAM(s) IV Push every 6 hours PRN Nausea and/or Vomiting      Physical Exam:  Constitutional: NAD, A+O x3  CV: RR S1S2 no m/r/g  Lungs: CTA b/l, good air flow b/l  Abdomen: soft, non distended or tympanic, minimally tender. no guarding, no rebound, normal bowel sounds  Incision: vertical incision c/d/i with staples.  Extremities: no lower extremity edema or calf tenderness bilaterally; venodynes in place    LABS:             10.7   x     )-----------( x        ( 04-18 @ 14:55 )             33.3                8.8    5.57  )-----------( 222      ( 04-18 @ 06:25 )             27.7                10.5   6.49  )-----------( 287      ( 04-17 @ 06:51 )             33.0       04-19    140    |  105    |  <2<L>  ----------------------------<  84     3.6     |  23     |  0.53   04-18    138    |  103    |  9      ----------------------------<  88     3.6     |  22     |  0.55     Ca    8.8        19 Apr 2021 06:16  Ca    8.4        18 Apr 2021 06:25  Phos  3.5       04-19  Phos  3.2       04-18  Mg     1.7       04-19  Mg     1.7       04-18

## 2021-04-19 NOTE — CONSULT NOTE ADULT - CONSULT REASON
inability to tolerate PO, POD#7 s/p ex-lap, LS, CT showing abdominal fluid collection
n/v, epigastric pain
nausea/vomiting

## 2021-04-19 NOTE — CONSULT NOTE ADULT - ATTENDING COMMENTS
Patient with nausea/vomiting and reflux post-op, now day 10.   Has history of Hirschprung's and prolonged post-op ileus.   Suspect likely recurrent post-op ileus.   CT with possible enteritis, unclear if reactive. Lower suspicion for active bowel infection.  Would continue supportive care, as above. Would advance diet very slowly and encourage ambulation.   If persistent symptoms of nausea/vomiting, may need another trial of NGT decompression.

## 2021-04-19 NOTE — PROGRESS NOTE ADULT - ASSESSMENT
34yo F POD#10 HD#4 after ex-lap w/ midline vertical incision 2/2 significant bowel adhesions for a left salpingectomy, a/w abdominal pain 2/2 possible post-op ileus vs. enteritis. Pt self limited PO fluid and food, but able to tolerating 2 crackers yesterday without emesis. C/o dyspepsia and gas that is improved most with Pecid and TUMs, and minimally with simethicone. Pain improved with Toradol. VSS.

## 2021-04-19 NOTE — CONSULT NOTE ADULT - SUBJECTIVE AND OBJECTIVE BOX
Chief Complaint:  Patient is a 35y old  Female who presents with a chief complaint of Nausea/vomiting (19 Apr 2021 06:49)      HPI: 35F Hx Hirschsprung's disease (dx infant) s/p abdominal colectomy w/ ileal rectal anastomosis, right hydrosalpinx s/p salpingectomy (5 years ago) c/b prolonged post-op ileus, now s/p ex-lap w/ left salpingectomy for left hydrosalpinx, GENO (POD #10) p/w n/v + now epigastric burning.         Allergies:  No Known Allergies      Home Medications:    Hospital Medications:  acetaminophen   Tablet .. 975 milliGRAM(s) Oral every 6 hours PRN  aluminum hydroxide/magnesium hydroxide/simethicone Suspension 30 milliLiter(s) Oral every 4 hours PRN  calcium carbonate    500 mG (Tums) Chewable 1 Tablet(s) Chew every 4 hours PRN  citric acid/sodium citrate Solution 30 milliLiter(s) Oral every 24 hours PRN  famotidine    Tablet 20 milliGRAM(s) Oral two times a day  heparin   Injectable 5000 Unit(s) SubCutaneous every 12 hours  ibuprofen  Tablet. 600 milliGRAM(s) Oral every 6 hours PRN  lactated ringers. 1000 milliLiter(s) IV Continuous <Continuous>  metoclopramide Injectable 10 milliGRAM(s) IV Push every 6 hours PRN  ondansetron Injectable 4 milliGRAM(s) IV Push every 6 hours PRN  simethicone 80 milliGRAM(s) Chew three times a day      PMHX/PSHX:  Hirschsprung&#x27;s disease    Chronic salpingitis    DVT, lower extremity    Anemia    History of salpingectomy    Hirschsprung&#x27;s disease    Hirschsprung disease of rectosigmoid region        Family history:  FH: heart disease    FH: type 2 diabetes        Denies family history of colon cancer/polyps, stomach cancer/polyps, pancreatic cancer/masses, liver cancer/disease, ovarian cancer and endometrial cancer.    Social History:     Tob: Denies  EtOH: Denies  Illicit Drugs: Denies    ROS:     General:  No wt loss, fevers, chills, night sweats, fatigue  Eyes:  Good vision, no reported pain  ENT:  No sore throat, pain, runny nose, dysphagia  CV:  No pain, palpitations, hypo/hypertension  Pulm:  No dyspnea, cough, tachypnea, wheezing  GI: :  No pain, bleeding, incontinence, nocturia  Muscle:  No pain, weakness  Neuro:  No weakness, tingling, memory problems  Psych:  No fatigue, insomnia, mood problems, depression  Endocrine:  No polyuria, polydipsia, cold/heat intolerance  Heme:  No petechiae, ecchymosis, easy bruisability  Skin:  No rash, tattoos, scars, edema    PHYSICAL EXAM:     GENERAL:  No acute distress  HEENT:  Normocephalic/atraumatic, no scleral icterus  CHEST:  Clear to auscultation bilaterally, no wheezes/rales/ronchi, no accessory muscle use  HEART:  Regular rate and rhythm, no murmurs/rubs/gallops  ABDOMEN:  Soft, non-tender, non-distended, normoactive bowel sounds,  no masses, no hepato-splenomegaly, no signs of chronic liver disease  EXTREMITIES: No cyanosis, clubbing, or edema  SKIN:  No rash/erythema/ecchymoses/petechiae/wounds/abscess/warm/dry  NEURO:  Alert and oriented x 3, no asterixis    Vital Signs:  Vital Signs Last 24 Hrs  T(C): 37.2 (19 Apr 2021 13:40), Max: 37.6 (18 Apr 2021 21:27)  T(F): 98.9 (19 Apr 2021 13:40), Max: 99.6 (18 Apr 2021 21:27)  HR: 60 (19 Apr 2021 13:40) (56 - 82)  BP: 116/71 (19 Apr 2021 13:40) (107/68 - 122/66)  BP(mean): --  RR: 16 (19 Apr 2021 13:40) (16 - 18)  SpO2: 100% (19 Apr 2021 13:40) (97% - 100%)  Daily     Daily     LABS:                        9.7    5.03  )-----------( 252      ( 19 Apr 2021 08:39 )             30.7     Mean Cell Volume: 86.5 fL (04-19-21 @ 08:39)    04-19    140  |  105  |  <2<L>  ----------------------------<  84  3.6   |  23  |  0.53    Ca    8.8      19 Apr 2021 06:16  Phos  3.5     04-19  Mg     1.7     04-19                                    9.7    5.03  )-----------( 252      ( 19 Apr 2021 08:39 )             30.7                         10.7   x     )-----------( x        ( 18 Apr 2021 14:55 )             33.3                         8.8    5.57  )-----------( 222      ( 18 Apr 2021 06:25 )             27.7                         10.5   6.49  )-----------( 287      ( 17 Apr 2021 06:51 )             33.0       Imaging:    < from: CT Abdomen and Pelvis w/ Oral Cont and w/ IV Cont (04.16.21 @ 05:04) >    EXAM:  CT ABDOMEN AND PELVIS OC IC        PROCEDURE DATE:  Apr 16 2021         INTERPRETATION:  CLINICAL INFORMATION: Abdominal distention, Hirschsprung's disease, history of salpingectomy and lysis of adhesions on 4/9/2021.    COMPARISON: CT abdomen pelvis from 2/6/2021    CONTRAST/COMPLICATIONS:  IV Contrast: Omnipaque 350  90 cc administered   10 cc discarded  Oral Contrast: Omnipaque 300 + Fruit 2o  Complications: None reported at time of study completion    PROCEDURE:  CT of the Abdomen and Pelvis was performed.  Sagittal and coronal reformats were performed.    FINDINGS:  LOWER CHEST: Minimal bilateral lower lobe subsegmental atelectasis.    LIVER: Within normal limits.  BILE DUCTS: Normal caliber.  GALLBLADDER: Within normal limits.  SPLEEN: Within normal limits.  PANCREAS: Within normal limits.  ADRENALS: Within normal limits.  KIDNEYS/URETERS: Bilateral subcentimeter hypodense lesions, too small to characterize. No hydronephrosis.    BLADDER: Mild bladder wall thickening with perivesicular haziness.  REPRODUCTIVE ORGANS: 3.4 cm left and 3.3 cm right adnexal cysts, unchanged. Uterus within normal limits. Nabothian cyst.    BOWEL: Wall thickening of several loops of small bowel within the left lower quadrant with dilatation of small bowel proximal to this level. Oral contrast passes distally into the colon. Postsurgical partial colectomy changes with rectal anastomosis is unchanged. Colon is again noted to be located within the right side of the abdomen.  PERITONEUM: Small small foci of pneumoperitoneum in the left pelvis, likely related to recent surgery. Ill-defined fluid within the lower abdomen with areas of peripheral enhancement and local measuring 3.8 x 2.3 cm in the right lower abdomen (601, 24).  VESSELS: Within normal limits. Again seen is a retroaortic left renal vein, a normal anatomic variant.  RETROPERITONEUM/LYMPH NODES: No lymphadenopathy.  ABDOMINAL WALL: Postsurgical changes.  BONES: Within normal limits.    IMPRESSION:  Wall thickening of several small bowel loops in the left lower quadrant with dilatation of small bowel proximal to this level. Oral contrast passes distally into the colon. Findings are suspicious for enteritis (which can be infectious, inflammatory or ischemic in etiology) with associated reactive ileus versus partial small bowel obstruction. Correlation with lactate is recommended.    Small foci of pneumoperitoneum likely related to recent surgery. Ill-defined free fluid within the lower abdomen with areas of loculationand peripheral enhancement, worrisome for phlegmon and possible developing abscess. No discrete drainable collection identified at this time.    Mild diffuse bladder wall thickening and perivesicular haziness, which may reflect cystitis. Correlation with urinalysis is recommended.    Unchanged bilateral adnexal cysts. Pelvic ultrasound may be obtained for further evaluation as clinically warranted.    < end of copied text >             Chief Complaint:  Patient is a 35y old  Female who presents with a chief complaint of Nausea/vomiting (19 Apr 2021 06:49)      HPI: 35F Hx Hirschsprung's disease (dx infant) s/p abdominal colectomy w/ ileal rectal anastomosis, right hydrosalpinx s/p salpingectomy (5 years ago) c/b prolonged post-op ileus, now s/p ex-lap w/ left salpingectomy for left hydrosalpinx, GENO (POD #10) p/w n/v + now epigastric burning.     Since surgery on 4/9, pt endorses recurrent episodes of nausea, at times followed by vomiting -- after surgery had NGT x 4 days, then tolerated 1 meal without vomiting.     After getting home pt tried tomato soup + jello, which she subsequently began to experience nausea from followed by vomiting. Continues to endorse now having n/v mainly anytime she takes food/some liquids, followed by episodes of epigastric burning/feeling of acid in throat. No prior Hx of reflux/heartburn/GERD. Was able to keep down some crackers in the last 24hr. +Flatus (this am), +small brown BM (this am). Denies abd pain, d/f/c.           Allergies:  No Known Allergies      Home Medications:    Hospital Medications:  acetaminophen   Tablet .. 975 milliGRAM(s) Oral every 6 hours PRN  aluminum hydroxide/magnesium hydroxide/simethicone Suspension 30 milliLiter(s) Oral every 4 hours PRN  calcium carbonate    500 mG (Tums) Chewable 1 Tablet(s) Chew every 4 hours PRN  citric acid/sodium citrate Solution 30 milliLiter(s) Oral every 24 hours PRN  famotidine    Tablet 20 milliGRAM(s) Oral two times a day  heparin   Injectable 5000 Unit(s) SubCutaneous every 12 hours  ibuprofen  Tablet. 600 milliGRAM(s) Oral every 6 hours PRN  lactated ringers. 1000 milliLiter(s) IV Continuous <Continuous>  metoclopramide Injectable 10 milliGRAM(s) IV Push every 6 hours PRN  ondansetron Injectable 4 milliGRAM(s) IV Push every 6 hours PRN  simethicone 80 milliGRAM(s) Chew three times a day      PMHX/PSHX:  Hirschsprung&#x27;s disease    Chronic salpingitis    DVT, lower extremity    Anemia    History of salpingectomy    Hirschsprung&#x27;s disease    Hirschsprung disease of rectosigmoid region        Family history:  FH: heart disease    FH: type 2 diabetes        Denies family history of colon cancer/polyps, stomach cancer/polyps, pancreatic cancer/masses, liver cancer/disease, ovarian cancer and endometrial cancer.    Social History:     Tob: Denies  EtOH: Denies  Illicit Drugs: Denies    ROS:     General:  No wt loss, fevers, chills, night sweats, fatigue  Eyes:  Good vision, no reported pain  ENT:  No sore throat, pain, runny nose, dysphagia  CV:  No pain, palpitations, hypo/hypertension  Pulm:  No dyspnea, cough, tachypnea, wheezing  GI: see above  :  No pain, bleeding, incontinence, nocturia  Muscle:  No pain, weakness  Neuro:  No weakness, tingling, memory problems  Psych:  No fatigue, insomnia, mood problems, depression  Endocrine:  No polyuria, polydipsia, cold/heat intolerance  Heme:  No petechiae, ecchymosis, easy bruisability  Skin:  No rash, tattoos, scars, edema    PHYSICAL EXAM:     GENERAL:  No acute distress, WD WN, sitting bent over on chair  HEENT:  Normocephalic/atraumatic, no scleral icterus  CHEST:  Clear to auscultation bilaterally, no wheezes/rales/ronchi, no accessory muscle use  HEART:  Regular rate and rhythm, no murmurs/rubs/gallops  ABDOMEN:  Soft, non-tender, non-distended, normoactive bowel sounds,  no masses, +vertical staples midline c/d/i   EXTREMITIES: No cyanosis, clubbing, or edema  SKIN:  No rash/warm/dry  NEURO:  Alert and oriented x 3        Vital Signs:  Vital Signs Last 24 Hrs  T(C): 37.2 (19 Apr 2021 13:40), Max: 37.6 (18 Apr 2021 21:27)  T(F): 98.9 (19 Apr 2021 13:40), Max: 99.6 (18 Apr 2021 21:27)  HR: 60 (19 Apr 2021 13:40) (56 - 82)  BP: 116/71 (19 Apr 2021 13:40) (107/68 - 122/66)  BP(mean): --  RR: 16 (19 Apr 2021 13:40) (16 - 18)  SpO2: 100% (19 Apr 2021 13:40) (97% - 100%)  Daily     Daily     LABS:                        9.7    5.03  )-----------( 252      ( 19 Apr 2021 08:39 )             30.7     Mean Cell Volume: 86.5 fL (04-19-21 @ 08:39)    04-19    140  |  105  |  <2<L>  ----------------------------<  84  3.6   |  23  |  0.53    Ca    8.8      19 Apr 2021 06:16  Phos  3.5     04-19  Mg     1.7     04-19                                    9.7    5.03  )-----------( 252      ( 19 Apr 2021 08:39 )             30.7                         10.7   x     )-----------( x        ( 18 Apr 2021 14:55 )             33.3                         8.8    5.57  )-----------( 222      ( 18 Apr 2021 06:25 )             27.7                         10.5   6.49  )-----------( 287      ( 17 Apr 2021 06:51 )             33.0       Imaging:    < from: CT Abdomen and Pelvis w/ Oral Cont and w/ IV Cont (04.16.21 @ 05:04) >    EXAM:  CT ABDOMEN AND PELVIS OC IC        PROCEDURE DATE:  Apr 16 2021         INTERPRETATION:  CLINICAL INFORMATION: Abdominal distention, Hirschsprung's disease, history of salpingectomy and lysis of adhesions on 4/9/2021.    COMPARISON: CT abdomen pelvis from 2/6/2021    CONTRAST/COMPLICATIONS:  IV Contrast: Omnipaque 350  90 cc administered   10 cc discarded  Oral Contrast: Omnipaque 300 + Fruit 2o  Complications: None reported at time of study completion    PROCEDURE:  CT of the Abdomen and Pelvis was performed.  Sagittal and coronal reformats were performed.    FINDINGS:  LOWER CHEST: Minimal bilateral lower lobe subsegmental atelectasis.    LIVER: Within normal limits.  BILE DUCTS: Normal caliber.  GALLBLADDER: Within normal limits.  SPLEEN: Within normal limits.  PANCREAS: Within normal limits.  ADRENALS: Within normal limits.  KIDNEYS/URETERS: Bilateral subcentimeter hypodense lesions, too small to characterize. No hydronephrosis.    BLADDER: Mild bladder wall thickening with perivesicular haziness.  REPRODUCTIVE ORGANS: 3.4 cm left and 3.3 cm right adnexal cysts, unchanged. Uterus within normal limits. Nabothian cyst.    BOWEL: Wall thickening of several loops of small bowel within the left lower quadrant with dilatation of small bowel proximal to this level. Oral contrast passes distally into the colon. Postsurgical partial colectomy changes with rectal anastomosis is unchanged. Colon is again noted to be located within the right side of the abdomen.  PERITONEUM: Small small foci of pneumoperitoneum in the left pelvis, likely related to recent surgery. Ill-defined fluid within the lower abdomen with areas of peripheral enhancement and local measuring 3.8 x 2.3 cm in the right lower abdomen (601, 24).  VESSELS: Within normal limits. Again seen is a retroaortic left renal vein, a normal anatomic variant.  RETROPERITONEUM/LYMPH NODES: No lymphadenopathy.  ABDOMINAL WALL: Postsurgical changes.  BONES: Within normal limits.    IMPRESSION:  Wall thickening of several small bowel loops in the left lower quadrant with dilatation of small bowel proximal to this level. Oral contrast passes distally into the colon. Findings are suspicious for enteritis (which can be infectious, inflammatory or ischemic in etiology) with associated reactive ileus versus partial small bowel obstruction. Correlation with lactate is recommended.    Small foci of pneumoperitoneum likely related to recent surgery. Ill-defined free fluid within the lower abdomen with areas of loculationand peripheral enhancement, worrisome for phlegmon and possible developing abscess. No discrete drainable collection identified at this time.    Mild diffuse bladder wall thickening and perivesicular haziness, which may reflect cystitis. Correlation with urinalysis is recommended.    Unchanged bilateral adnexal cysts. Pelvic ultrasound may be obtained for further evaluation as clinically warranted.    < end of copied text >

## 2021-04-20 ENCOUNTER — APPOINTMENT (OUTPATIENT)
Dept: COLORECTAL SURGERY | Facility: CLINIC | Age: 36
End: 2021-04-20

## 2021-04-20 LAB — SURGICAL PATHOLOGY STUDY: SIGNIFICANT CHANGE UP

## 2021-04-20 PROCEDURE — 99232 SBSQ HOSP IP/OBS MODERATE 35: CPT | Mod: GC

## 2021-04-20 RX ADMIN — FAMOTIDINE 20 MILLIGRAM(S): 10 INJECTION INTRAVENOUS at 21:44

## 2021-04-20 RX ADMIN — SIMETHICONE 80 MILLIGRAM(S): 80 TABLET, CHEWABLE ORAL at 06:21

## 2021-04-20 RX ADMIN — PANTOPRAZOLE SODIUM 40 MILLIGRAM(S): 20 TABLET, DELAYED RELEASE ORAL at 11:32

## 2021-04-20 RX ADMIN — HEPARIN SODIUM 5000 UNIT(S): 5000 INJECTION INTRAVENOUS; SUBCUTANEOUS at 17:14

## 2021-04-20 RX ADMIN — FAMOTIDINE 20 MILLIGRAM(S): 10 INJECTION INTRAVENOUS at 08:27

## 2021-04-20 RX ADMIN — SODIUM CHLORIDE 75 MILLILITER(S): 9 INJECTION, SOLUTION INTRAVENOUS at 17:15

## 2021-04-20 RX ADMIN — SIMETHICONE 80 MILLIGRAM(S): 80 TABLET, CHEWABLE ORAL at 13:42

## 2021-04-20 RX ADMIN — SODIUM CHLORIDE 75 MILLILITER(S): 9 INJECTION, SOLUTION INTRAVENOUS at 21:44

## 2021-04-20 RX ADMIN — SIMETHICONE 80 MILLIGRAM(S): 80 TABLET, CHEWABLE ORAL at 21:44

## 2021-04-20 RX ADMIN — SODIUM CHLORIDE 75 MILLILITER(S): 9 INJECTION, SOLUTION INTRAVENOUS at 06:28

## 2021-04-20 RX ADMIN — HEPARIN SODIUM 5000 UNIT(S): 5000 INJECTION INTRAVENOUS; SUBCUTANEOUS at 06:21

## 2021-04-20 NOTE — PROGRESS NOTE ADULT - SUBJECTIVE AND OBJECTIVE BOX
POD#11   HD#5    Patient seen and examined at bedside. No acute overnight events. No acute complaints, pain well controlled. She had 2 episodes of emesis, most recently at 6pm last night. She has been NPO since then, and feels much better, has not vomited since. Dyspepsis has improved with pantoprazole, famotidine and tums. She passed a BM this AM. Denies abdominal pain. Patient is ambulating. She is passing flatus. Voiding spontaneously. Denies CP, SOB, fevers, and chills.    Vital Signs Last 24 Hours  T(C): 36.8 (04-20-21 @ 06:26), Max: 37.2 (04-19-21 @ 13:40)  HR: 52 (04-20-21 @ 06:26) (51 - 60)  BP: 112/64 (04-20-21 @ 06:26) (100/52 - 116/71)  RR: 18 (04-20-21 @ 06:26) (16 - 18)  SpO2: 98% (04-20-21 @ 06:26) (98% - 100%)    Physical Exam:  General: NAD  CV: RRR  Lungs: CTAB  Abdomen: Soft, non-tender, non-distended, +BS  Incision: C/D/I  Ext: No pain or swelling    Labs:                        9.7    5.03  )-----------( 252      ( 19 Apr 2021 08:39 )             30.7   baso 0.2    eos 0.6    imm gran 0.2    lymph 25.4   mono 10.9   poly 62.7                         10.7   x     )-----------( x        ( 18 Apr 2021 14:55 )             33.3   baso x      eos x      imm gran x      lymph x      mono x      poly x                            8.8    5.57  )-----------( 222      ( 18 Apr 2021 06:25 )             27.7   baso x      eos x      imm gran x      lymph x      mono x      poly x          MEDICATIONS  (STANDING):  famotidine    Tablet 20 milliGRAM(s) Oral two times a day  heparin   Injectable 5000 Unit(s) SubCutaneous every 12 hours  lactated ringers. 1000 milliLiter(s) (75 mL/Hr) IV Continuous <Continuous>  pantoprazole    Tablet 40 milliGRAM(s) Oral daily  simethicone 80 milliGRAM(s) Chew three times a day    MEDICATIONS  (PRN):  acetaminophen   Tablet .. 975 milliGRAM(s) Oral every 6 hours PRN Mild Pain (1 - 3)  aluminum hydroxide/magnesium hydroxide/simethicone Suspension 30 milliLiter(s) Oral every 4 hours PRN Dyspepsia  calcium carbonate    500 mG (Tums) Chewable 1 Tablet(s) Chew every 4 hours PRN Gas  citric acid/sodium citrate Solution 30 milliLiter(s) Oral every 24 hours PRN acid reflux  ibuprofen  Tablet. 600 milliGRAM(s) Oral every 6 hours PRN Moderate Pain (4 - 6)  metoclopramide Injectable 10 milliGRAM(s) IV Push every 6 hours PRN Nausea/vomiting  ondansetron Injectable 4 milliGRAM(s) IV Push every 6 hours PRN Nausea and/or Vomiting

## 2021-04-20 NOTE — PROGRESS NOTE ADULT - SUBJECTIVE AND OBJECTIVE BOX
Chief Complaint:  Patient is a 35y old  Female who presents with a chief complaint of Nausea/vomiting (20 Apr 2021 07:15)      Interval Events:   - Pt did not have anything to eat or drink last night, now felt much better this morning  - Tried water with meds, able to tolerate. Once she trialed some juice, had some nausea but went away after walking   - Had significant flatus + small BM this AM   - Currently denies abd pain, n/v/d/f/c since lunch yesterday afternoon    Allergies:  No Known Allergies        Hospital Medications:  acetaminophen   Tablet .. 975 milliGRAM(s) Oral every 6 hours PRN  aluminum hydroxide/magnesium hydroxide/simethicone Suspension 30 milliLiter(s) Oral every 4 hours PRN  calcium carbonate    500 mG (Tums) Chewable 1 Tablet(s) Chew every 4 hours PRN  citric acid/sodium citrate Solution 30 milliLiter(s) Oral every 24 hours PRN  famotidine    Tablet 20 milliGRAM(s) Oral two times a day  heparin   Injectable 5000 Unit(s) SubCutaneous every 12 hours  ibuprofen  Tablet. 600 milliGRAM(s) Oral every 6 hours PRN  lactated ringers. 1000 milliLiter(s) IV Continuous <Continuous>  metoclopramide Injectable 10 milliGRAM(s) IV Push every 6 hours PRN  ondansetron Injectable 4 milliGRAM(s) IV Push every 6 hours PRN  pantoprazole    Tablet 40 milliGRAM(s) Oral daily  simethicone 80 milliGRAM(s) Chew three times a day      PMHX/PSHX:  Hirschsprung&#x27;s disease    Chronic salpingitis    DVT, lower extremity    Anemia    History of salpingectomy    Hirschsprung&#x27;s disease    Hirschsprung disease of rectosigmoid region        Family history:  FH: heart disease    FH: type 2 diabetes        ROS:     General:  No wt loss, fevers, chills, night sweats, fatigue,   Eyes:  Good vision, no reported pain  ENT:  No sore throat, pain, runny nose, dysphagia  CV:  No pain, palpitations, hypo/hypertension  Pulm:  No dyspnea, cough, tachypnea, wheezing  GI: see above  :  No pain, bleeding, incontinence, nocturia  Muscle:  No pain, weakness  Neuro:  No weakness, tingling, memory problems  Psych:  No fatigue, insomnia, mood problems, depression  Endocrine:  No polyuria, polydipsia, cold/heat intolerance  Heme:  No petechiae, ecchymosis, easy bruisability  Skin:  No rash, tattoos, scars, edema      PHYSICAL EXAM:   Vital Signs:  Vital Signs Last 24 Hrs  T(C): 36.9 (20 Apr 2021 10:33), Max: 37.2 (19 Apr 2021 13:40)  T(F): 98.4 (20 Apr 2021 10:33), Max: 98.9 (19 Apr 2021 13:40)  HR: 60 (20 Apr 2021 10:33) (51 - 60)  BP: 114/66 (20 Apr 2021 10:33) (100/52 - 116/71)  BP(mean): --  RR: 18 (20 Apr 2021 10:33) (16 - 18)  SpO2: 98% (20 Apr 2021 10:33) (98% - 100%)  Daily     Daily     GENERAL:  No acute distress, WD WN, sitting comfortably   HEENT:  Normocephalic/atraumatic, no scleral icterus  CHEST:  Clear to auscultation bilaterally, no wheezes/rales/ronchi, no accessory muscle use  HEART:  Regular rate and rhythm, no murmurs/rubs/gallops  ABDOMEN:  Soft, non-tender, non-distended, normoactive bowel sounds,  no masses, +vertical staples midline c/d/i   EXTREMITIES: No cyanosis, clubbing, or edema  SKIN:  No rash/warm/dry  NEURO:  Alert and oriented x 3  LABS:                        9.7    5.03  )-----------( 252      ( 19 Apr 2021 08:39 )             30.7       04-19    140  |  105  |  <2<L>  ----------------------------<  84  3.6   |  23  |  0.53    Ca    8.8      19 Apr 2021 06:16  Phos  3.5     04-19  Mg     1.7     04-19                                    9.7    5.03  )-----------( 252      ( 19 Apr 2021 08:39 )             30.7                         10.7   x     )-----------( x        ( 18 Apr 2021 14:55 )             33.3                         8.8    5.57  )-----------( 222      ( 18 Apr 2021 06:25 )             27.7       Imaging:

## 2021-04-20 NOTE — PROGRESS NOTE ADULT - ASSESSMENT
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       IMPRESSION:   #N/V, epigastric burning: pt with on-going nausea, vomiting and epigastric burning after recent surgery. Found to have CT a/p showing dilated loops of small bowel + c/f possible phlegmon vs developing abscess. Symptoms likely 2/2 prolonged post-op ileus vs CT findings may be 2/2 reactive changes after surgery vs would continue to monitor for developing phlegmon/abscess. Afebrile, without leukocytosis or diarrhea, clinically less likely to be 2/2 gastroenteritis at this time.       RECOMMENDATIONS:   - Would not advance diet further than clears/liquids at this time --> once tolerating can do full liquids   - Encourage bowel rest when having n/v  - NGT for decompression w/ NPO if persistent n/v not responsive to bowel rest   - Monitor electrolytes, maintain within normal range to avoid prolonging ileus   - For nausea: zofran  - For heartburn: PPI 40mg daily, pepcid 20mg BID         Thank you for involving us in the care of this patient, please reach out if any further questions.     Zoltan Grossman MD  Gastroenterology Fellow, PGY4    Available on [employer] Teams  [phone] ([facility])  [id number] ([facility])  Please contact on call fellow weekdays after 5pm-7am and weekends: [phone]

## 2021-04-20 NOTE — PROGRESS NOTE ADULT - ASSESSMENT
A/P: 36yo F POD#11 HD#5 s/p ex-lap w/ midline vertical incision 2/2 significant bowel adhesions for a left salpingectomy, a/w abdominal pain 2/2 possible post-op ileus vs. enteritis. Pt had 2 episodes of emesis yesterday, but feels well after self-limiting PO intake.

## 2021-04-20 NOTE — PROGRESS NOTE ADULT - PROBLEM SELECTOR PLAN 1
Neuro: Continue po pain meds  CV: Hemodynamically stable  Pulm: Saturating well on room air, encourage oob/amb  GI: Advance to CLD, then low-residue regular diet today as tolerated, continue pantoprazole, famotidine, tums, and zofran. Nutrition consult placed, f/u diet recs  : UOP adequate, voiding freely  Heme: c/w SQH and SCDs for DVT ppx  Dispo: Patient clinically improving, continue routine post-op care and management of inability to tolerate PO    Emily Dash PGY1

## 2021-04-20 NOTE — PROGRESS NOTE ADULT - SUBJECTIVE AND OBJECTIVE BOX
Overnight Events:  No acute events overnight    SUBJECTIVE:  Reports feeling better this AM since being NPO, would like to continue  Denies nausea, vomiting this AM  Reports passing a significant amount of gas overnight with small BM  would like to wait one more day before trying fluids again  Ambulating independently    OBJECTIVE:  Vital Signs Last 24 Hrs  T(C): 36.8 (20 Apr 2021 06:26), Max: 37.2 (19 Apr 2021 13:40)  T(F): 98.3 (20 Apr 2021 06:26), Max: 98.9 (19 Apr 2021 13:40)  HR: 52 (20 Apr 2021 06:26) (51 - 60)  BP: 112/64 (20 Apr 2021 06:26) (100/52 - 116/71)  BP(mean): --  RR: 18 (20 Apr 2021 06:26) (16 - 18)  SpO2: 98% (20 Apr 2021 06:26) (98% - 100%)      Physical Examination:  GEN: NAD, resting quietly  NEURO: AAOx3, CN II-XII grossly intact, no focal deficits  PULM: symmetric chest rise bilaterally, no increased WOB  ABD: soft, nontender, minimally distended, midline incision healing well with staples  EXTR: no lower extremity edema, moving all extremities    LABS:                        9.7    5.03  )-----------( 252      ( 19 Apr 2021 08:39 )             30.7       04-19    140  |  105  |  <2<L>  ----------------------------<  84  3.6   |  23  |  0.53    Ca    8.8      19 Apr 2021 06:16  Phos  3.5     04-19  Mg     1.7     04-19

## 2021-04-20 NOTE — PROGRESS NOTE ADULT - ASSESSMENT
35y Female patient S/P ex-lap, GENO, left salpingectomy with gyn. Patient is +/+ for GI fxn. Re-presenting with abdominal pain and cramping.     Plan:  - GI consult noted: recommend NPO statsu for ileus, however patient continues to pass gas including a lot last night; would like to advance very slowly tomorrow  - patient CT noted for enteritis, as well as chronically dilated small bowel  - no surgical intervention indicated at this time  - no contraindication to discharge when patient feels comfortable with going home  - will need follow up with gynecology and colorectal surgery    [facility]  [id number]

## 2021-04-21 PROCEDURE — 99232 SBSQ HOSP IP/OBS MODERATE 35: CPT | Mod: GC

## 2021-04-21 RX ADMIN — SODIUM CHLORIDE 75 MILLILITER(S): 9 INJECTION, SOLUTION INTRAVENOUS at 11:44

## 2021-04-21 RX ADMIN — FAMOTIDINE 20 MILLIGRAM(S): 10 INJECTION INTRAVENOUS at 10:54

## 2021-04-21 RX ADMIN — SIMETHICONE 80 MILLIGRAM(S): 80 TABLET, CHEWABLE ORAL at 15:48

## 2021-04-21 RX ADMIN — HEPARIN SODIUM 5000 UNIT(S): 5000 INJECTION INTRAVENOUS; SUBCUTANEOUS at 17:59

## 2021-04-21 RX ADMIN — SIMETHICONE 80 MILLIGRAM(S): 80 TABLET, CHEWABLE ORAL at 21:54

## 2021-04-21 RX ADMIN — SODIUM CHLORIDE 75 MILLILITER(S): 9 INJECTION, SOLUTION INTRAVENOUS at 05:20

## 2021-04-21 RX ADMIN — FAMOTIDINE 20 MILLIGRAM(S): 10 INJECTION INTRAVENOUS at 21:54

## 2021-04-21 RX ADMIN — HEPARIN SODIUM 5000 UNIT(S): 5000 INJECTION INTRAVENOUS; SUBCUTANEOUS at 05:20

## 2021-04-21 RX ADMIN — SIMETHICONE 80 MILLIGRAM(S): 80 TABLET, CHEWABLE ORAL at 05:20

## 2021-04-21 RX ADMIN — PANTOPRAZOLE SODIUM 40 MILLIGRAM(S): 20 TABLET, DELAYED RELEASE ORAL at 11:44

## 2021-04-21 NOTE — PROGRESS NOTE ADULT - PROBLEM SELECTOR PLAN 1
Neuro: Continue po pain meds  CV: Hemodynamically stable  Pulm: Saturating well on room air, continue oob/amb  GI: Advance as tolerated, patient will attempt crackers this morning and then regular diet for lunch, nutrition consult placed yesterday, f/u recs  : UOP adequate, voiding freely  Heme: c/w HSQ and SCDs for DVT ppx  Post-op Care: f/u w/ general surgery re: staple removal  Dispo: Continue routine post-op care; if able to tolerate regular diet and continuing to meet all postoperative milestones, anticipate discharge tomorrow    Emily Dash PGY1 Neuro: Continue po pain meds  CV: Hemodynamically stable  Pulm: Saturating well on room air, continue oob/amb  GI: Advance as tolerated, patient will attempt crackers this morning and then full liquids, nutrition consult placed yesterday, f/u recs  : UOP adequate, voiding freely  Heme: c/w HSQ and SCDs for DVT ppx  Post-op Care: f/u w/ general surgery re: staple removal  Dispo: Continue routine post-op care; if able to tolerate regular diet and continuing to meet all postoperative milestones, anticipate discharge tomorrow    Emily Dash PGY1

## 2021-04-21 NOTE — PROGRESS NOTE ADULT - SUBJECTIVE AND OBJECTIVE BOX
Chief Complaint:  Patient is a 35y old  Female who presents with a chief complaint of Nausea/vomiting (20 Apr 2021 07:15)      Interval Events:   - Pt had broth, juice, orange icy, and ice cream -- after ice cream experienced bloating, able to tolerate everything without any emesis   - Had significant flatus + small BM again AM   - Currently denies abd pain, d/f/c       Allergies:  No Known Allergies        Hospital Medications:  acetaminophen   Tablet .. 975 milliGRAM(s) Oral every 6 hours PRN  aluminum hydroxide/magnesium hydroxide/simethicone Suspension 30 milliLiter(s) Oral every 4 hours PRN  calcium carbonate    500 mG (Tums) Chewable 1 Tablet(s) Chew every 4 hours PRN  citric acid/sodium citrate Solution 30 milliLiter(s) Oral every 24 hours PRN  famotidine    Tablet 20 milliGRAM(s) Oral two times a day  heparin   Injectable 5000 Unit(s) SubCutaneous every 12 hours  ibuprofen  Tablet. 600 milliGRAM(s) Oral every 6 hours PRN  lactated ringers. 1000 milliLiter(s) IV Continuous <Continuous>  metoclopramide Injectable 10 milliGRAM(s) IV Push every 6 hours PRN  ondansetron Injectable 4 milliGRAM(s) IV Push every 6 hours PRN  pantoprazole    Tablet 40 milliGRAM(s) Oral daily  simethicone 80 milliGRAM(s) Chew three times a day      PMHX/PSHX:  Hirschsprung&#x27;s disease    Chronic salpingitis    DVT, lower extremity    Anemia    History of salpingectomy    Hirschsprung&#x27;s disease    Hirschsprung disease of rectosigmoid region        Family history:  FH: heart disease    FH: type 2 diabetes        ROS:     General:  No wt loss, fevers, chills, night sweats, fatigue,   Eyes:  Good vision, no reported pain  ENT:  No sore throat, pain, runny nose, dysphagia  CV:  No pain, palpitations, hypo/hypertension  Pulm:  No dyspnea, cough, tachypnea, wheezing  GI: see above  :  No pain, bleeding, incontinence, nocturia  Muscle:  No pain, weakness  Neuro:  No weakness, tingling, memory problems  Psych:  No fatigue, insomnia, mood problems, depression  Endocrine:  No polyuria, polydipsia, cold/heat intolerance  Heme:  No petechiae, ecchymosis, easy bruisability  Skin:  No rash, tattoos, scars, edema      PHYSICAL EXAM:   Vital Signs Last 24 Hrs  T(C): 37.1 (21 Apr 2021 05:18), Max: 37.1 (20 Apr 2021 17:20)  T(F): 98.7 (21 Apr 2021 05:18), Max: 98.7 (20 Apr 2021 17:20)  HR: 54 (21 Apr 2021 05:18) (54 - 74)  BP: 118/73 (21 Apr 2021 05:18) (102/63 - 118/73)  BP(mean): --  RR: 16 (21 Apr 2021 05:18) (16 - 18)  SpO2: 98% (21 Apr 2021 05:18) (98% - 100%)      GENERAL:  No acute distress, WD WN, sitting comfortably   HEENT:  Normocephalic/atraumatic, no scleral icterus  CHEST:  Clear to auscultation bilaterally, no wheezes/rales/ronchi, no accessory muscle use  HEART:  Regular rate and rhythm, no murmurs/rubs/gallops  ABDOMEN:  Soft, non-tender, non-distended, normoactive bowel sounds,  no masses, +vertical staples midline c/d/i   EXTREMITIES: No cyanosis, clubbing, or edema  SKIN:  No rash/warm/dry  NEURO:  Alert and oriented x 3    LABS:                Imaging:

## 2021-04-21 NOTE — DIETITIAN INITIAL EVALUATION ADULT. - OTHER INFO
RD visited with patient for requested consultation.  Patient stated her nausea has improved; she did report that she has fear of having further nausea/vomiting, but educated patient to take it slow and increase intake at each meal. Patient reported that she had ice cream yesterday and that she felt bloated thereafter. She reported that the PPI meds are helping with symptoms of heartburn. Spoke with GYN team and suggested to consider changing Zofran from PRN to standing to help minimize potential nausea, if medically appropriate.  RD also discussed possible oral supplements i.e. Ensure Clear v. Orgain Vegan Shakes; per discussion with patient, Orgain shakes to be added with meals as more calorically / protein dense; encouraged patient to have the shakes over ice, as may be most palatable.  Patient reported weight -155 pounds. Weight on 4/17 (dosing) - 154 pounds.  Bedscale weight taken, however, likely not accurate (169 pounds). Would suggest obtaining current weight via standing scale as feasible.

## 2021-04-21 NOTE — PROGRESS NOTE ADULT - SUBJECTIVE AND OBJECTIVE BOX
POD#12  HD#6    Patient seen and examined at bedside. No acute overnight events. No acute complaints, pain well controlled.  Patient is ambulating. She is passing flatus and BM. Voiding spontaneously and tolerating clears. Denies CP, SOB, N/V, fevers, and chills.    Vital Signs Last 24 Hours  T(C): 37.1 (04-21-21 @ 05:18), Max: 37.1 (04-20-21 @ 17:20)  HR: 54 (04-21-21 @ 05:18) (54 - 74)  BP: 118/73 (04-21-21 @ 05:18) (102/63 - 118/73)  RR: 16 (04-21-21 @ 05:18) (16 - 18)  SpO2: 98% (04-21-21 @ 05:18) (98% - 100%)    I&O's Summary    20 Apr 2021 07:01  -  21 Apr 2021 07:00  --------------------------------------------------------  IN: 2160 mL / OUT: 1200 mL / NET: 960 mL    Physical Exam:  General: NAD  CV: RRR  Lungs: CTAB  Abdomen: Soft, non-tender, non-distended, +BS  Incision: C/D/I by staples  Ext: No pain or swelling    Labs:                        9.7    5.03  )-----------( 252      ( 19 Apr 2021 08:39 )             30.7   baso 0.2    eos 0.6    imm gran 0.2    lymph 25.4   mono 10.9   poly 62.7                         10.7   x     )-----------( x        ( 18 Apr 2021 14:55 )             33.3   baso x      eos x      imm gran x      lymph x      mono x      poly x          MEDICATIONS  (STANDING):  famotidine    Tablet 20 milliGRAM(s) Oral two times a day  heparin   Injectable 5000 Unit(s) SubCutaneous every 12 hours  lactated ringers. 1000 milliLiter(s) (75 mL/Hr) IV Continuous <Continuous>  pantoprazole    Tablet 40 milliGRAM(s) Oral daily  simethicone 80 milliGRAM(s) Chew three times a day    MEDICATIONS  (PRN):  acetaminophen   Tablet .. 975 milliGRAM(s) Oral every 6 hours PRN Mild Pain (1 - 3)  aluminum hydroxide/magnesium hydroxide/simethicone Suspension 30 milliLiter(s) Oral every 4 hours PRN Dyspepsia  calcium carbonate    500 mG (Tums) Chewable 1 Tablet(s) Chew every 4 hours PRN Gas  citric acid/sodium citrate Solution 30 milliLiter(s) Oral every 24 hours PRN acid reflux  ibuprofen  Tablet. 600 milliGRAM(s) Oral every 6 hours PRN Moderate Pain (4 - 6)  metoclopramide Injectable 10 milliGRAM(s) IV Push every 6 hours PRN Nausea/vomiting  ondansetron Injectable 4 milliGRAM(s) IV Push every 6 hours PRN Nausea and/or Vomiting

## 2021-04-21 NOTE — PROVIDER CONTACT NOTE (OTHER) - SITUATION
Pt. abdominal incision w/ ser-sanguinous leak.  Pt. c/o feeling full since eating ice cream yesterday.

## 2021-04-21 NOTE — PROVIDER CONTACT NOTE (OTHER) - ASSESSMENT
AOX4, abdomen mildly distended, hypoactive BS, (+) serosanguinous leak on middle part of incision, staples intact. Denies nausea or vomitting. c/o feeling full since eating ice cream yesterdday.

## 2021-04-21 NOTE — PROGRESS NOTE ADULT - ASSESSMENT
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       IMPRESSION:   #N/V, epigastric burning: pt with on-going nausea, vomiting and epigastric burning after recent surgery. Found to have CT a/p showing dilated loops of small bowel + c/f possible phlegmon vs developing abscess. Symptoms likely 2/2 prolonged post-op ileus vs CT findings may be 2/2 reactive changes after surgery vs less likely phlegmon/abscess. Afebrile, without leukocytosis or diarrhea, clinically less likely to be 2/2 gastroenteritis at this time.       RECOMMENDATIONS:   - Would not advance diet further than clears/liquids at this time --> once tolerating can do full liquids   - Encourage bowel rest when having n/v  - NGT for decompression w/ NPO if persistent n/v not responsive to bowel rest   - Monitor electrolytes, maintain within normal range to avoid prolonging ileus   - For nausea: zofran  - For heartburn: PPI 40mg daily, pepcid 20mg BID         Thank you for involving us in the care of this patient, please reach out if any further questions.     Zoltan Grossman MD  Gastroenterology Fellow, PGY4    Available on [employer] Teams  [phone] ([facility])  [id number] ([facility])  Please contact on call fellow weekdays after 5pm-7am and weekends: [phone]

## 2021-04-21 NOTE — PROGRESS NOTE ADULT - SUBJECTIVE AND OBJECTIVE BOX
Overnight Events:  No acute events overnight    SUBJECTIVE:  Reports improved pain  Denies nausea, vomiting with liquids  Is passing gas and having bowel movements overnight  Tolerating liquid diet    OBJECTIVE:  Vital Signs Last 24 Hrs  T(C): 37.1 (21 Apr 2021 05:18), Max: 37.1 (20 Apr 2021 17:20)  T(F): 98.7 (21 Apr 2021 05:18), Max: 98.7 (20 Apr 2021 17:20)  HR: 54 (21 Apr 2021 05:18) (54 - 74)  BP: 118/73 (21 Apr 2021 05:18) (102/63 - 118/73)  BP(mean): --  RR: 16 (21 Apr 2021 05:18) (16 - 18)  SpO2: 98% (21 Apr 2021 05:18) (98% - 100%)      Physical Examination:  GEN: NAD, resting quietly  NEURO: AAOx3, CN II-XII grossly intact, no focal deficits  PULM: symmetric chest rise bilaterally, no increased WOB  ABD: soft, nontender, nondistended  EXTR: no lower extremity edema, moving all extremities

## 2021-04-21 NOTE — PROGRESS NOTE ADULT - ASSESSMENT
A/P: 36yo POD#12 s/p LSC converted to ex-lap LS, extensive GENO, and repair of bowel serosa, a/w N/V, patient now tolerating clears, patient stable and doing well.

## 2021-04-21 NOTE — DIETITIAN INITIAL EVALUATION ADULT. - PERTINENT MEDS FT
MEDICATIONS  (STANDING):  famotidine    Tablet 20 milliGRAM(s) Oral two times a day  heparin   Injectable 5000 Unit(s) SubCutaneous every 12 hours  lactated ringers. 1000 milliLiter(s) (75 mL/Hr) IV Continuous <Continuous>  pantoprazole    Tablet 40 milliGRAM(s) Oral daily  simethicone 80 milliGRAM(s) Chew three times a day    MEDICATIONS  (PRN):  acetaminophen   Tablet .. 975 milliGRAM(s) Oral every 6 hours PRN Mild Pain (1 - 3)  aluminum hydroxide/magnesium hydroxide/simethicone Suspension 30 milliLiter(s) Oral every 4 hours PRN Dyspepsia  calcium carbonate    500 mG (Tums) Chewable 1 Tablet(s) Chew every 4 hours PRN Gas  citric acid/sodium citrate Solution 30 milliLiter(s) Oral every 24 hours PRN acid reflux  ibuprofen  Tablet. 600 milliGRAM(s) Oral every 6 hours PRN Moderate Pain (4 - 6)  metoclopramide Injectable 10 milliGRAM(s) IV Push every 6 hours PRN Nausea/vomiting  ondansetron Injectable 4 milliGRAM(s) IV Push every 6 hours PRN Nausea and/or Vomiting

## 2021-04-21 NOTE — DIETITIAN INITIAL EVALUATION ADULT. - ADD RECOMMEND
1) Monitor weights, PO intake/diet tolerance, skin integrity, pertinent labs. 2) Please reconsult RD as needed for further suggestions / interventions.  3) Consider liquid multivitamin for micronutrient coverage.  4) Medication management of nausea to help optimize PO intake as medically appropriate.

## 2021-04-21 NOTE — DIETITIAN INITIAL EVALUATION ADULT. - CHIEF COMPLAINT
A/P: 36yo POD#12 s/p LSC converted to ex-lap LS, extensive GENO, and repair of bowel serosa, a/w N/V, patient now tolerating clears, patient stable and doing well. Diet has been advanced to full liquids.

## 2021-04-21 NOTE — PROGRESS NOTE ADULT - ASSESSMENT
35y Female patient S/P ex-lap, GENO, left salpingectomy with gyn. Patient is +/+ for GI fxn. Re-presenting with abdominal pain and cramping.     Plan:  - GI consult noted  - patient tolerated liquid diet, will continue to try and advance  - patient CT noted for enteritis, as well as chronically dilated small bowel  - no surgical intervention indicated at this time  - no contraindication to discharge when patient feels comfortable with going home  - will need follow up with gynecology and colorectal surgery    [facility]  [id number]

## 2021-04-21 NOTE — DIETITIAN INITIAL EVALUATION ADULT. - EDUCATION DIETARY MODIFICATIONS
Discussed nutritional strategies to increase PO intake, minimize nausea./teach back/(2) meets goals/outcomes/verbalization

## 2021-04-21 NOTE — DIETITIAN INITIAL EVALUATION ADULT. - PERTINENT LABORATORY DATA
04-19 Na140 mmol/L Glu 84 mg/dL K+ 3.6 mmol/L Cr  0.53 mg/dL BUN <2 mg/dL<L> 04-19 Phos 3.5 mg/dL 04-16 Alb 4.0 g/dL

## 2021-04-22 ENCOUNTER — APPOINTMENT (OUTPATIENT)
Dept: OBGYN | Facility: CLINIC | Age: 36
End: 2021-04-22

## 2021-04-22 ENCOUNTER — TRANSCRIPTION ENCOUNTER (OUTPATIENT)
Age: 36
End: 2021-04-22

## 2021-04-22 VITALS
DIASTOLIC BLOOD PRESSURE: 60 MMHG | RESPIRATION RATE: 20 BRPM | SYSTOLIC BLOOD PRESSURE: 110 MMHG | OXYGEN SATURATION: 99 % | TEMPERATURE: 99 F | HEART RATE: 76 BPM

## 2021-04-22 PROCEDURE — 99231 SBSQ HOSP IP/OBS SF/LOW 25: CPT | Mod: GC

## 2021-04-22 RX ORDER — IBUPROFEN 200 MG
1 TABLET ORAL
Qty: 0 | Refills: 0 | DISCHARGE
Start: 2021-04-22

## 2021-04-22 RX ORDER — ACETAMINOPHEN 500 MG
3 TABLET ORAL
Qty: 0 | Refills: 0 | DISCHARGE
Start: 2021-04-22

## 2021-04-22 RX ADMIN — PANTOPRAZOLE SODIUM 40 MILLIGRAM(S): 20 TABLET, DELAYED RELEASE ORAL at 11:35

## 2021-04-22 RX ADMIN — SIMETHICONE 80 MILLIGRAM(S): 80 TABLET, CHEWABLE ORAL at 13:04

## 2021-04-22 RX ADMIN — SODIUM CHLORIDE 75 MILLILITER(S): 9 INJECTION, SOLUTION INTRAVENOUS at 08:34

## 2021-04-22 RX ADMIN — SIMETHICONE 80 MILLIGRAM(S): 80 TABLET, CHEWABLE ORAL at 05:36

## 2021-04-22 RX ADMIN — FAMOTIDINE 20 MILLIGRAM(S): 10 INJECTION INTRAVENOUS at 08:34

## 2021-04-22 NOTE — PROGRESS NOTE ADULT - NSICDXPILOT_GEN_ALL_CORE
[place]

## 2021-04-22 NOTE — PROGRESS NOTE ADULT - ASSESSMENT
34yo POD#12 s/p LSC converted to ex-lap LS, extensive GENO, and repair of bowel serosa, a/w N/V, patient now tolerating regular diet, patient stable and doing well.

## 2021-04-22 NOTE — PROGRESS NOTE ADULT - PROBLEM SELECTOR PLAN 1
Neuro: Continue po pain meds  CV: Hemodynamically stable  Pulm: Saturating well on room air, continue oob/amb  GI: Advance as tolerated, patient now tolerating regular diet w/ improved appetite.  Dietician consult w/ recs to add orgain for improved nutritional supplementation. No episodes of emesis in past day  : UOP adequate, voiding freely  Heme: c/w HSQ and SCDs for DVT ppx  Dispo: patient w/ improved appetite and ability to tolerate PO intake, anticipate d/c later today pending tolerance of breakfast    Raven Bui PGY2

## 2021-04-22 NOTE — PROGRESS NOTE ADULT - ASSESSMENT
35F Hx Hirschsprung's disease (dx infant) s/p abdominal colectomy w/ ileal rectal anastomosis, right hydrosalpinx s/p salpingectomy (5 years ago) c/b prolonged post-op ileus, now s/p ex-lap w/ left salpingectomy for left hydrosalpinx, GENO p/w n/v + now epigastric burning, likely all 2/2 prolonged post-op ileus again (now improving)      IMPRESSION:   #N/V, epigastric burning 2/2 post-op ileus: pt had on-going nausea, vomiting and epigastric burning after recent surgery. Found to have CT a/p showing dilated loops of small bowel + c/f possible phlegmon vs developing abscess (which is clinically less likely, pt without fevers or leukocytosis + clinically improving). Symptoms likely 2/2 prolonged post-op ileus vs CT findings may be 2/2 reactive changes after surgery. Now clinically improving with bowel rest, tolerating small amounts of solids.       RECOMMENDATIONS:   - Ok to advance to solids per pt's tolerance  - Encourage bowel rest when having n/v  - NGT for decompression w/ NPO if persistent n/v not responsive to bowel rest   - Monitor electrolytes, maintain within normal range to avoid prolonging ileus   - For nausea: zofran  - For heartburn: PPI 40mg daily, pepcid 20mg BID (likely can wean off once pt no longer having n/v)   - Please call back GI if needed        Thank you for involving us in the care of this patient, please reach out if any further questions.     Zoltan Grossman MD  Gastroenterology Fellow, PGY4    Available on [employer] Teams  [phone] ([facility])  [phone] ([facility])  Please contact on call fellow weekdays after 5pm-7am and weekends: [phone]

## 2021-04-22 NOTE — DISCHARGE NOTE NURSING/CASE MANAGEMENT/SOCIAL WORK - PATIENT PORTAL LINK FT
You can access the FollowMyHealth Patient Portal offered by [facility] by registering at the following website: [email or web address]. By joining [employer]’s FollowMyHealth portal, you will also be able to view your health information using other applications (apps) compatible with our system.

## 2021-04-22 NOTE — PROGRESS NOTE ADULT - SUBJECTIVE AND OBJECTIVE BOX
Chief Complaint:  Patient is a 35y old  Female who presents with a chief complaint of Nausea/vomiting (20 Apr 2021 07:15)      Interval Events:   - Pt had tuna yesterday + protein drink this AM, able tolerate without any emesis   - Had significant flatus + small BM again AM   - Currently denies abd pain, d/f/c       Allergies:  No Known Allergies        Hospital Medications:  acetaminophen   Tablet .. 975 milliGRAM(s) Oral every 6 hours PRN  aluminum hydroxide/magnesium hydroxide/simethicone Suspension 30 milliLiter(s) Oral every 4 hours PRN  calcium carbonate    500 mG (Tums) Chewable 1 Tablet(s) Chew every 4 hours PRN  citric acid/sodium citrate Solution 30 milliLiter(s) Oral every 24 hours PRN  famotidine    Tablet 20 milliGRAM(s) Oral two times a day  heparin   Injectable 5000 Unit(s) SubCutaneous every 12 hours  ibuprofen  Tablet. 600 milliGRAM(s) Oral every 6 hours PRN  lactated ringers. 1000 milliLiter(s) IV Continuous <Continuous>  metoclopramide Injectable 10 milliGRAM(s) IV Push every 6 hours PRN  ondansetron Injectable 4 milliGRAM(s) IV Push every 6 hours PRN  pantoprazole    Tablet 40 milliGRAM(s) Oral daily  simethicone 80 milliGRAM(s) Chew three times a day      PMHX/PSHX:  Hirschsprung&#x27;s disease    Chronic salpingitis    DVT, lower extremity    Anemia    History of salpingectomy    Hirschsprung&#x27;s disease    Hirschsprung disease of rectosigmoid region        Family history:  FH: heart disease    FH: type 2 diabetes        ROS:     General:  No wt loss, fevers, chills, night sweats, fatigue,   Eyes:  Good vision, no reported pain  ENT:  No sore throat, pain, runny nose, dysphagia  CV:  No pain, palpitations, hypo/hypertension  Pulm:  No dyspnea, cough, tachypnea, wheezing  GI: see above  :  No pain, bleeding, incontinence, nocturia  Muscle:  No pain, weakness  Neuro:  No weakness, tingling, memory problems  Psych:  No fatigue, insomnia, mood problems, depression  Endocrine:  No polyuria, polydipsia, cold/heat intolerance  Heme:  No petechiae, ecchymosis, easy bruisability  Skin:  No rash, tattoos, scars, edema      PHYSICAL EXAM:   Vital Signs Last 24 Hrs  T(C): 36.8 (22 Apr 2021 09:32), Max: 37.2 (21 Apr 2021 14:45)  T(F): 98.2 (22 Apr 2021 09:32), Max: 98.9 (21 Apr 2021 14:45)  HR: 68 (22 Apr 2021 09:32) (57 - 82)  BP: 101/52 (22 Apr 2021 09:32) (100/67 - 110/61)  BP(mean): --  RR: 18 (22 Apr 2021 09:32) (16 - 20)  SpO2: 100% (22 Apr 2021 09:32) (98% - 100%)      GENERAL:  No acute distress, WD WN, sitting comfortably   HEENT:  Normocephalic/atraumatic, no scleral icterus  CHEST:  Clear to auscultation bilaterally, no wheezes/rales/ronchi, no accessory muscle use  HEART:  Regular rate and rhythm, no murmurs/rubs/gallops  ABDOMEN:  Soft, non-tender, non-distended, normoactive bowel sounds,  no masses, +vertical staples midline c/d/i   EXTREMITIES: No cyanosis, clubbing, or edema  SKIN:  No rash/warm/dry  NEURO:  Alert and oriented x 3    LABS:                                        Imaging:

## 2021-04-22 NOTE — PROGRESS NOTE ADULT - SUBJECTIVE AND OBJECTIVE BOX
R2 MOINN Progress Note    POD#13   HD#7    Patient seen and examined at bedside.  No acute events overnight. No acute complaints.  Pain well controlled. Patient is ambulating and tolerating regular diet.  Patient w/ improved appetite.  Patient is passing flatus and bowel movements.  Patient is voiding spontaneously.   Denies CP, SOB, N/V, fevers, and chills.    Vital Signs Last 24 Hours  T(C): 36.7 (04-22-21 @ 05:35), Max: 37.2 (04-21-21 @ 14:45)  HR: 62 (04-22-21 @ 05:35) (57 - 82)  BP: 105/67 (04-22-21 @ 05:35) (100/67 - 110/61)  RR: 18 (04-22-21 @ 05:35) (16 - 20)  SpO2: 98% (04-22-21 @ 05:35) (98% - 100%)    I&O's Summary    21 Apr 2021 07:01  -  22 Apr 2021 07:00  --------------------------------------------------------  IN: 1325 mL / OUT: 1650 mL / NET: -325 mL    Physical Exam:  General: NAD  CV: RR, S1, S2  Lungs: CTA b/l, good air flow b/l   Abdomen: Soft, non-tender to palpation diffusely, non-distended, normoactive bowel sounds  Incision: midline vertical incision w/ staples in place removed at bedside, small area of drainage in the inferior aspect of the incision  Ext: warm and well perfused    Labs:                        9.7    5.03  )-----------( 252      ( 19 Apr 2021 08:39 )             30.7   baso 0.2    eos 0.6    imm gran 0.2    lymph 25.4   mono 10.9   poly 62.7       MEDICATIONS  (STANDING):  famotidine    Tablet 20 milliGRAM(s) Oral two times a day  heparin   Injectable 5000 Unit(s) SubCutaneous every 12 hours  lactated ringers. 1000 milliLiter(s) (75 mL/Hr) IV Continuous <Continuous>  pantoprazole    Tablet 40 milliGRAM(s) Oral daily  simethicone 80 milliGRAM(s) Chew three times a day    MEDICATIONS  (PRN):  acetaminophen   Tablet .. 975 milliGRAM(s) Oral every 6 hours PRN Mild Pain (1 - 3)  aluminum hydroxide/magnesium hydroxide/simethicone Suspension 30 milliLiter(s) Oral every 4 hours PRN Dyspepsia  calcium carbonate    500 mG (Tums) Chewable 1 Tablet(s) Chew every 4 hours PRN Gas  citric acid/sodium citrate Solution 30 milliLiter(s) Oral every 24 hours PRN acid reflux  ibuprofen  Tablet. 600 milliGRAM(s) Oral every 6 hours PRN Moderate Pain (4 - 6)  metoclopramide Injectable 10 milliGRAM(s) IV Push every 6 hours PRN Nausea/vomiting  ondansetron Injectable 4 milliGRAM(s) IV Push every 6 hours PRN Nausea and/or Vomiting

## 2021-04-22 NOTE — PROGRESS NOTE ADULT - PROVIDER SPECIALTY LIST ADULT
Gastroenterology
Gastroenterology
Surgery
GYN
Surgery
GYN
Surgery
GYN
Gastroenterology
Surgery
GYN

## 2021-04-22 NOTE — PROGRESS NOTE ADULT - REASON FOR ADMISSION
Nausea/vomiting

## 2021-04-24 PROBLEM — K21.9 GERD (GASTROESOPHAGEAL REFLUX DISEASE): Status: ACTIVE | Noted: 2021-04-24

## 2021-04-24 RX ORDER — FAMOTIDINE 20 MG/1
20 TABLET, FILM COATED ORAL
Qty: 30 | Refills: 0 | Status: ACTIVE | COMMUNITY
Start: 2021-04-24 | End: 1900-01-01

## 2021-04-24 RX ORDER — PANTOPRAZOLE 40 MG/1
40 TABLET, DELAYED RELEASE ORAL DAILY
Qty: 20 | Refills: 1 | Status: ACTIVE | COMMUNITY
Start: 2021-04-24 | End: 1900-01-01

## 2021-04-24 RX ORDER — SIMETHICONE 80 MG/1
80 TABLET, CHEWABLE ORAL
Qty: 30 | Refills: 2 | Status: ACTIVE | COMMUNITY
Start: 2021-04-24 | End: 1900-01-01

## 2021-05-06 ENCOUNTER — APPOINTMENT (OUTPATIENT)
Dept: OBGYN | Facility: CLINIC | Age: 36
End: 2021-05-06
Payer: COMMERCIAL

## 2021-05-06 VITALS
BODY MASS INDEX: 24.83 KG/M2 | DIASTOLIC BLOOD PRESSURE: 74 MMHG | HEIGHT: 65 IN | HEART RATE: 54 BPM | SYSTOLIC BLOOD PRESSURE: 112 MMHG | WEIGHT: 149 LBS

## 2021-05-06 DIAGNOSIS — N70.11 CHRONIC SALPINGITIS: ICD-10-CM

## 2021-05-06 PROCEDURE — 99024 POSTOP FOLLOW-UP VISIT: CPT

## 2021-05-10 PROBLEM — N70.11 HYDROSALPINX: Status: ACTIVE | Noted: 2021-01-14

## 2021-05-10 NOTE — PHYSICAL EXAM
[FreeTextEntry7] : vertical incision mostly well approximated. 1.5 cm area of previous separation now reapproximated by secondary intention. No d/c.

## 2021-05-10 NOTE — HISTORY OF PRESENT ILLNESS
[FreeTextEntry1] : 36 yo P0 here for PO check after dx lsc --> Extensive xlap rt salpingectomy 4/9/21.\par PO course noteworthy for readmission for inability to francie PO in setting of h/o multiple GI surgeries for management of Hirschsprung's dz.\par Pt now able to francie PO and void.\par \par Concerned about appearance of part of her incision.

## 2021-05-10 NOTE — DISCUSSION/SUMMARY
[FreeTextEntry1] : 36 yo P0 here for PO check after dx lsc --> Extensive xlap rt salpingectomy 21.\par PO course noteworthy for readmission for inability to francie PO in setting of h/o multiple GI surgeries for management of Hirschsprung's dz.\par Pt generally recovering well.\par \par Precautions about future surgery (especially ) reviewed.\par Pt to f/u w/Dr. Guadalupe for WILLIAM\par \par Letter to Dr. Sheng Guadalupe

## 2022-07-23 NOTE — H&P PST ADULT - NEGATIVE NEUROLOGICAL SYMPTOMS
no transient paralysis/no weakness/no paresthesias/no generalized seizures/no syncope/no tremors/no difficulty walking/no headache
No

## 2023-07-13 NOTE — BRIEF OPERATIVE NOTE - NSICDXBRIEFOPLAUNCH_GEN_ALL_CORE
<--- Click to Launch ICDx for PreOp, PostOp and Procedure
<--- Click to Launch ICDx for PreOp, PostOp and Procedure
additional pressure ulcers

## 2023-08-31 NOTE — ASU PATIENT PROFILE, ADULT - AS SC BRADEN SENSORY
Status post successful pelvic angiogram with a small focus of active extravasation from a left lumbar branch which was embolized using a coil. Next, a small branch of the left internal iliac artery was noted to provide supply to the previously visualized lumbar branch and decision to embolize using a coil was made.   Right common femoral artery access closed using the Celt closure device. Status post successful pelvic angiogram with a small focus of active extravasation from a left lumbar branch which was embolized using a coil. Next, a small iliolumbar branch arising from the posterior left internal iliac artery was noted to provide supply to the previously visualized lumbar branch and decision to embolize using a coil was made.   Right common femoral artery access closed using the Celt closure device. (4) no impairment

## 2024-09-01 NOTE — PATIENT PROFILE ADULT - NSPROPTRIGHTCAREGIVER_GEN_A_NUR
PAST MEDICAL HISTORY:  AML (acute myeloid leukemia)     Chronic UTI     GERD (gastroesophageal reflux disease)     Hyperlipidemia     Palpitations     Post corneal transplant     
no